# Patient Record
Sex: FEMALE | URBAN - NONMETROPOLITAN AREA
[De-identification: names, ages, dates, MRNs, and addresses within clinical notes are randomized per-mention and may not be internally consistent; named-entity substitution may affect disease eponyms.]

---

## 2023-11-21 ENCOUNTER — TRANSCRIBE ORDERS (OUTPATIENT)
Dept: ADMINISTRATIVE | Facility: HOSPITAL | Age: 68
End: 2023-11-21
Payer: COMMERCIAL

## 2023-11-21 DIAGNOSIS — Z12.31 ENCOUNTER FOR SCREENING MAMMOGRAM FOR MALIGNANT NEOPLASM OF BREAST: Primary | ICD-10-CM

## 2024-01-11 LAB — NCCN CRITERIA FLAG: NORMAL

## 2024-01-25 ENCOUNTER — HOSPITAL ENCOUNTER (OUTPATIENT)
Dept: MAMMOGRAPHY | Facility: HOSPITAL | Age: 69
Discharge: HOME | End: 2024-01-25
Admitting: NURSE PRACTITIONER
Payer: MEDICARE

## 2024-01-25 DIAGNOSIS — Z12.31 ENCOUNTER FOR SCREENING MAMMOGRAM FOR MALIGNANT NEOPLASM OF BREAST: ICD-10-CM

## 2024-01-25 PROCEDURE — 77063 BREAST TOMOSYNTHESIS BI: CPT

## 2024-01-25 PROCEDURE — 77067 SCR MAMMO BI INCL CAD: CPT

## 2024-11-15 ENCOUNTER — HOSPITAL ENCOUNTER (OUTPATIENT)
Dept: PREADMISSION TESTING | Age: 69
Discharge: HOME | End: 2024-11-19
Payer: MEDICARE

## 2024-11-15 VITALS — HEIGHT: 62 IN | BODY MASS INDEX: 19.88 KG/M2 | WEIGHT: 108 LBS

## 2024-11-15 LAB
ABO/RH: NORMAL
ANION GAP SERPL CALCULATED.3IONS-SCNC: 13 MMOL/L (ref 7–19)
ANTIBODY SCREEN: NORMAL
APTT PPP: 27.6 SEC (ref 26–36.2)
BASOPHILS # BLD: 0.1 K/UL (ref 0–0.2)
BASOPHILS NFR BLD: 0.7 % (ref 0–1)
BUN SERPL-MCNC: 14 MG/DL (ref 8–23)
CALCIUM SERPL-MCNC: 10.1 MG/DL (ref 8.8–10.2)
CHLORIDE SERPL-SCNC: 96 MMOL/L (ref 98–111)
CO2 SERPL-SCNC: 27 MMOL/L (ref 22–29)
CREAT SERPL-MCNC: 0.8 MG/DL (ref 0.5–0.9)
EOSINOPHIL # BLD: 0.1 K/UL (ref 0–0.6)
EOSINOPHIL NFR BLD: 0.9 % (ref 0–5)
ERYTHROCYTE [DISTWIDTH] IN BLOOD BY AUTOMATED COUNT: 14.1 % (ref 11.5–14.5)
GLUCOSE SERPL-MCNC: 83 MG/DL (ref 70–99)
HCT VFR BLD AUTO: 45.9 % (ref 37–47)
HGB BLD-MCNC: 14.9 G/DL (ref 12–16)
IMM GRANULOCYTES # BLD: 0 K/UL
INR PPP: 1.01 (ref 0.88–1.18)
LYMPHOCYTES # BLD: 1.7 K/UL (ref 1.1–4.5)
LYMPHOCYTES NFR BLD: 24.6 % (ref 20–40)
MCH RBC QN AUTO: 31.7 PG (ref 27–31)
MCHC RBC AUTO-ENTMCNC: 32.5 G/DL (ref 33–37)
MCV RBC AUTO: 97.7 FL (ref 81–99)
MONOCYTES # BLD: 0.6 K/UL (ref 0–0.9)
MONOCYTES NFR BLD: 8.7 % (ref 0–10)
NEUTROPHILS # BLD: 4.5 K/UL (ref 1.5–7.5)
NEUTS SEG NFR BLD: 64.7 % (ref 50–65)
PLATELET # BLD AUTO: 375 K/UL (ref 130–400)
PMV BLD AUTO: 9.6 FL (ref 9.4–12.3)
POTASSIUM SERPL-SCNC: 4.5 MMOL/L (ref 3.5–5)
PROTHROMBIN TIME: 13 SEC (ref 12–14.6)
RBC # BLD AUTO: 4.7 M/UL (ref 4.2–5.4)
SODIUM SERPL-SCNC: 136 MMOL/L (ref 136–145)
WBC # BLD AUTO: 6.9 K/UL (ref 4.8–10.8)

## 2024-11-15 PROCEDURE — 85610 PROTHROMBIN TIME: CPT

## 2024-11-15 PROCEDURE — 80048 BASIC METABOLIC PNL TOTAL CA: CPT

## 2024-11-15 PROCEDURE — 85730 THROMBOPLASTIN TIME PARTIAL: CPT

## 2024-11-15 PROCEDURE — 93005 ELECTROCARDIOGRAM TRACING: CPT | Performed by: SURGERY

## 2024-11-15 PROCEDURE — 86850 RBC ANTIBODY SCREEN: CPT

## 2024-11-15 PROCEDURE — 86901 BLOOD TYPING SEROLOGIC RH(D): CPT

## 2024-11-15 PROCEDURE — 85025 COMPLETE CBC W/AUTO DIFF WBC: CPT

## 2024-11-15 PROCEDURE — 86900 BLOOD TYPING SEROLOGIC ABO: CPT

## 2024-11-15 RX ORDER — CLOPIDOGREL BISULFATE 75 MG/1
1 TABLET ORAL
COMMUNITY
Start: 2024-11-12 | End: 2025-02-10

## 2024-11-15 RX ORDER — UMECLIDINIUM BROMIDE AND VILANTEROL TRIFENATATE 62.5; 25 UG/1; UG/1
1 POWDER RESPIRATORY (INHALATION) DAILY PRN
COMMUNITY

## 2024-11-15 RX ORDER — ALBUTEROL SULFATE 90 UG/1
2 INHALANT RESPIRATORY (INHALATION) EVERY 6 HOURS PRN
COMMUNITY

## 2024-11-15 RX ORDER — DICLOFENAC SODIUM 75 MG/1
75 TABLET, DELAYED RELEASE ORAL 2 TIMES DAILY
COMMUNITY

## 2024-11-15 RX ORDER — CILOSTAZOL 100 MG/1
100 TABLET ORAL 2 TIMES DAILY
COMMUNITY

## 2024-11-15 RX ORDER — EZETIMIBE 10 MG/1
10 TABLET ORAL NIGHTLY
COMMUNITY

## 2024-11-15 RX ORDER — GABAPENTIN 100 MG/1
100 CAPSULE ORAL 3 TIMES DAILY
COMMUNITY

## 2024-11-15 RX ORDER — TRAZODONE HYDROCHLORIDE 100 MG/1
100 TABLET ORAL NIGHTLY
COMMUNITY

## 2024-11-15 NOTE — DISCHARGE INSTRUCTIONS
PREOPERATIVE GUIDELINES WHEN RECEIVING ANESTHESIA    Do not eat anything after midnight the night before your surgery. You may have water up to 2 hours before your arrival time. No gum or candy the morning of surgery.  This is extremely important for your safety.    Take a bath (or shower) the night before your surgery and you may brush your teeth the morning of your surgery.    You will be scheduled to arrive at the hospital 2 hours before your surgery, or follow your surgeon's instructions.    Dress comfortably.  Wear loose clothing that will be easy to remove and comfortable for your trip home.    You may wear eyeglasses but bring your cases with you as they must be remove before your surgery. If you wear contacts they will have to be removed before your surgery.    Hearing aids and dentures will need to be removed before your surgery. If you wear dentures, do not glue them in the morning of surgery.     Do not wear any jewelry, including body jewelry.  All jewelry will need to be removed prior to your surgery. This includes wedding rings. Any metal touching your body can cause a burn or may have to be cut off due to swelling or injury.    Do not wear fingernail polish or make-up.    It is best not to bring any valuables with you.    If you are to stay in the hospital overnight, bring your robe, slippers and personal toiletries that you may need.    POSTOPERATIVE GUIDELINES AFTER RECEIVING ANESTHESIA    If you are to go home after your surgery, you will need a responsible adult to drive you home.     You will not be able to take public transportation after your discharge from the Operative Care Unit unless you are accompanied by a        responsible adult.    On returning home, be sure to follow your physician's orders regarding diet, activity and medications.    Remember, surgery with general anesthesia or sedation may leave you sleepy, very tired and with a decreased appetite for 12 to 24 hours.    If you

## 2024-11-16 LAB
EKG P AXIS: 77 DEGREES
EKG P-R INTERVAL: 112 MS
EKG Q-T INTERVAL: 324 MS
EKG QRS DURATION: 74 MS
EKG QTC CALCULATION (BAZETT): 399 MS
EKG T AXIS: 11 DEGREES

## 2024-11-16 PROCEDURE — 93010 ELECTROCARDIOGRAM REPORT: CPT | Performed by: INTERNAL MEDICINE

## 2024-12-03 ENCOUNTER — ANESTHESIA EVENT (OUTPATIENT)
Dept: OPERATING ROOM | Age: 69
End: 2024-12-03
Payer: MEDICARE

## 2024-12-03 ENCOUNTER — ANESTHESIA (OUTPATIENT)
Dept: OPERATING ROOM | Age: 69
End: 2024-12-03
Payer: MEDICARE

## 2024-12-03 ENCOUNTER — HOSPITAL ENCOUNTER (OUTPATIENT)
Age: 69
Setting detail: OBSERVATION
LOS: 1 days | Discharge: HOME | End: 2024-12-04
Attending: SURGERY | Admitting: SURGERY
Payer: MEDICARE

## 2024-12-03 ENCOUNTER — APPOINTMENT (OUTPATIENT)
Dept: INTERVENTIONAL RADIOLOGY/VASCULAR | Age: 69
End: 2024-12-03
Attending: SURGERY
Payer: MEDICARE

## 2024-12-03 DIAGNOSIS — G89.18 POSTOPERATIVE PAIN: ICD-10-CM

## 2024-12-03 DIAGNOSIS — I70.213 ATHEROSCLEROSIS OF NATIVE ARTERIES OF EXTREMITIES WITH INTERMITTENT CLAUDICATION, BILATERAL LEGS (HCC): Primary | ICD-10-CM

## 2024-12-03 DIAGNOSIS — R07.9 CHEST PAIN, UNSPECIFIED TYPE: ICD-10-CM

## 2024-12-03 DIAGNOSIS — I48.91 ATRIAL FIBRILLATION, UNSPECIFIED TYPE (HCC): ICD-10-CM

## 2024-12-03 LAB
ABO/RH: NORMAL
ANTIBODY SCREEN: NORMAL

## 2024-12-03 PROCEDURE — 86900 BLOOD TYPING SEROLOGIC ABO: CPT

## 2024-12-03 PROCEDURE — 6360000004 HC RX CONTRAST MEDICATION: Performed by: SURGERY

## 2024-12-03 PROCEDURE — 86850 RBC ANTIBODY SCREEN: CPT

## 2024-12-03 PROCEDURE — A4217 STERILE WATER/SALINE, 500 ML: HCPCS | Performed by: SURGERY

## 2024-12-03 PROCEDURE — C1769 GUIDE WIRE: HCPCS | Performed by: SURGERY

## 2024-12-03 PROCEDURE — 6360000002 HC RX W HCPCS: Performed by: SURGERY

## 2024-12-03 PROCEDURE — 2580000003 HC RX 258: Performed by: NURSE ANESTHETIST, CERTIFIED REGISTERED

## 2024-12-03 PROCEDURE — 94640 AIRWAY INHALATION TREATMENT: CPT

## 2024-12-03 PROCEDURE — 7100000000 HC PACU RECOVERY - FIRST 15 MIN: Performed by: SURGERY

## 2024-12-03 PROCEDURE — 3700000001 HC ADD 15 MINUTES (ANESTHESIA): Performed by: SURGERY

## 2024-12-03 PROCEDURE — C1887 CATHETER, GUIDING: HCPCS | Performed by: SURGERY

## 2024-12-03 PROCEDURE — 2580000003 HC RX 258: Performed by: SURGERY

## 2024-12-03 PROCEDURE — 2580000003 HC RX 258: Performed by: ANESTHESIOLOGY

## 2024-12-03 PROCEDURE — 3600000005 HC SURGERY LEVEL 5 BASE: Performed by: SURGERY

## 2024-12-03 PROCEDURE — C1876 STENT, NON-COA/NON-COV W/DEL: HCPCS | Performed by: SURGERY

## 2024-12-03 PROCEDURE — C1894 INTRO/SHEATH, NON-LASER: HCPCS | Performed by: SURGERY

## 2024-12-03 PROCEDURE — 86901 BLOOD TYPING SEROLOGIC RH(D): CPT

## 2024-12-03 PROCEDURE — C1753 CATH, INTRAVAS ULTRASOUND: HCPCS | Performed by: SURGERY

## 2024-12-03 PROCEDURE — 2709999900 HC NON-CHARGEABLE SUPPLY: Performed by: SURGERY

## 2024-12-03 PROCEDURE — 2500000003 HC RX 250 WO HCPCS: Performed by: NURSE ANESTHETIST, CERTIFIED REGISTERED

## 2024-12-03 PROCEDURE — 6360000002 HC RX W HCPCS: Performed by: NURSE ANESTHETIST, CERTIFIED REGISTERED

## 2024-12-03 PROCEDURE — 7100000001 HC PACU RECOVERY - ADDTL 15 MIN: Performed by: SURGERY

## 2024-12-03 PROCEDURE — C1713 ANCHOR/SCREW BN/BN,TIS/BN: HCPCS | Performed by: SURGERY

## 2024-12-03 PROCEDURE — 6370000000 HC RX 637 (ALT 250 FOR IP): Performed by: SURGERY

## 2024-12-03 PROCEDURE — 3700000000 HC ANESTHESIA ATTENDED CARE: Performed by: SURGERY

## 2024-12-03 PROCEDURE — C1874 STENT, COATED/COV W/DEL SYS: HCPCS | Performed by: SURGERY

## 2024-12-03 PROCEDURE — 36415 COLL VENOUS BLD VENIPUNCTURE: CPT

## 2024-12-03 PROCEDURE — C1893 INTRO/SHEATH, FIXED,NON-PEEL: HCPCS | Performed by: SURGERY

## 2024-12-03 PROCEDURE — C1725 CATH, TRANSLUMIN NON-LASER: HCPCS | Performed by: SURGERY

## 2024-12-03 PROCEDURE — 2700000000 HC OXYGEN THERAPY PER DAY

## 2024-12-03 PROCEDURE — 94150 VITAL CAPACITY TEST: CPT

## 2024-12-03 PROCEDURE — 6360000002 HC RX W HCPCS: Performed by: ANESTHESIOLOGY

## 2024-12-03 PROCEDURE — G0378 HOSPITAL OBSERVATION PER HR: HCPCS

## 2024-12-03 PROCEDURE — 2500000003 HC RX 250 WO HCPCS: Performed by: ANESTHESIOLOGY

## 2024-12-03 PROCEDURE — 94760 N-INVAS EAR/PLS OXIMETRY 1: CPT

## 2024-12-03 PROCEDURE — C1760 CLOSURE DEV, VASC: HCPCS | Performed by: SURGERY

## 2024-12-03 PROCEDURE — 3600000015 HC SURGERY LEVEL 5 ADDTL 15MIN: Performed by: SURGERY

## 2024-12-03 DEVICE — IMPLANTABLE DEVICE: Type: IMPLANTABLE DEVICE | Site: AORTA | Status: FUNCTIONAL

## 2024-12-03 DEVICE — PREMOUNTED STENT SYSTEM
Type: IMPLANTABLE DEVICE | Site: AORTA | Status: FUNCTIONAL
Brand: EXPRESS® LD ILIAC / BILIARY

## 2024-12-03 DEVICE — GRAFT EVAR STNT L59MM DIA11-16MM CATH L135CM INTRO SHTH 8FR: Type: IMPLANTABLE DEVICE | Site: AORTA | Status: FUNCTIONAL

## 2024-12-03 DEVICE — DRUG-ELUTING VASCULAR STENT SYSTEM
Type: IMPLANTABLE DEVICE | Site: AORTA | Status: FUNCTIONAL
Brand: ELUVIA™

## 2024-12-03 RX ORDER — SODIUM CHLORIDE 0.9 % (FLUSH) 0.9 %
5-40 SYRINGE (ML) INJECTION EVERY 12 HOURS SCHEDULED
Status: DISCONTINUED | OUTPATIENT
Start: 2024-12-03 | End: 2024-12-04 | Stop reason: HOSPADM

## 2024-12-03 RX ORDER — SODIUM CHLORIDE, SODIUM LACTATE, POTASSIUM CHLORIDE, CALCIUM CHLORIDE 600; 310; 30; 20 MG/100ML; MG/100ML; MG/100ML; MG/100ML
INJECTION, SOLUTION INTRAVENOUS CONTINUOUS
Status: DISCONTINUED | OUTPATIENT
Start: 2024-12-03 | End: 2024-12-03 | Stop reason: HOSPADM

## 2024-12-03 RX ORDER — PROPOFOL 10 MG/ML
INJECTION, EMULSION INTRAVENOUS
Status: DISCONTINUED | OUTPATIENT
Start: 2024-12-03 | End: 2024-12-03 | Stop reason: SDUPTHER

## 2024-12-03 RX ORDER — SODIUM CHLORIDE 0.9 % (FLUSH) 0.9 %
5-40 SYRINGE (ML) INJECTION EVERY 12 HOURS SCHEDULED
Status: DISCONTINUED | OUTPATIENT
Start: 2024-12-03 | End: 2024-12-03 | Stop reason: HOSPADM

## 2024-12-03 RX ORDER — ONDANSETRON 2 MG/ML
4 INJECTION INTRAMUSCULAR; INTRAVENOUS
Status: DISCONTINUED | OUTPATIENT
Start: 2024-12-03 | End: 2024-12-04 | Stop reason: HOSPADM

## 2024-12-03 RX ORDER — SODIUM CHLORIDE 9 MG/ML
INJECTION, SOLUTION INTRAVENOUS PRN
Status: DISCONTINUED | OUTPATIENT
Start: 2024-12-03 | End: 2024-12-04 | Stop reason: HOSPADM

## 2024-12-03 RX ORDER — HYDROMORPHONE HYDROCHLORIDE 1 MG/ML
0.5 INJECTION, SOLUTION INTRAMUSCULAR; INTRAVENOUS; SUBCUTANEOUS EVERY 5 MIN PRN
Status: DISCONTINUED | OUTPATIENT
Start: 2024-12-03 | End: 2024-12-03 | Stop reason: HOSPADM

## 2024-12-03 RX ORDER — SODIUM CHLORIDE 0.9 % (FLUSH) 0.9 %
5-40 SYRINGE (ML) INJECTION PRN
Status: DISCONTINUED | OUTPATIENT
Start: 2024-12-03 | End: 2024-12-03 | Stop reason: HOSPADM

## 2024-12-03 RX ORDER — GABAPENTIN 100 MG/1
100 CAPSULE ORAL 3 TIMES DAILY
Status: DISCONTINUED | OUTPATIENT
Start: 2024-12-03 | End: 2024-12-04 | Stop reason: HOSPADM

## 2024-12-03 RX ORDER — FENTANYL CITRATE 50 UG/ML
INJECTION, SOLUTION INTRAMUSCULAR; INTRAVENOUS
Status: DISCONTINUED | OUTPATIENT
Start: 2024-12-03 | End: 2024-12-03 | Stop reason: SDUPTHER

## 2024-12-03 RX ORDER — ONDANSETRON 2 MG/ML
4 INJECTION INTRAMUSCULAR; INTRAVENOUS
Status: DISCONTINUED | OUTPATIENT
Start: 2024-12-03 | End: 2024-12-03 | Stop reason: HOSPADM

## 2024-12-03 RX ORDER — NITROGLYCERIN 20 MG/100ML
INJECTION INTRAVENOUS
Status: DISCONTINUED | OUTPATIENT
Start: 2024-12-03 | End: 2024-12-03 | Stop reason: SDUPTHER

## 2024-12-03 RX ORDER — NALOXONE HYDROCHLORIDE 0.4 MG/ML
INJECTION, SOLUTION INTRAMUSCULAR; INTRAVENOUS; SUBCUTANEOUS PRN
Status: DISCONTINUED | OUTPATIENT
Start: 2024-12-03 | End: 2024-12-03 | Stop reason: HOSPADM

## 2024-12-03 RX ORDER — OXYCODONE HYDROCHLORIDE 10 MG/1
10 TABLET ORAL EVERY 4 HOURS PRN
Status: DISCONTINUED | OUTPATIENT
Start: 2024-12-03 | End: 2024-12-04 | Stop reason: HOSPADM

## 2024-12-03 RX ORDER — ROCURONIUM BROMIDE 10 MG/ML
INJECTION, SOLUTION INTRAVENOUS
Status: DISCONTINUED | OUTPATIENT
Start: 2024-12-03 | End: 2024-12-03 | Stop reason: SDUPTHER

## 2024-12-03 RX ORDER — HYDROMORPHONE HYDROCHLORIDE 1 MG/ML
0.5 INJECTION, SOLUTION INTRAMUSCULAR; INTRAVENOUS; SUBCUTANEOUS
Status: DISCONTINUED | OUTPATIENT
Start: 2024-12-03 | End: 2024-12-04 | Stop reason: HOSPADM

## 2024-12-03 RX ORDER — DEXAMETHASONE SODIUM PHOSPHATE 4 MG/ML
4 INJECTION, SOLUTION INTRA-ARTICULAR; INTRALESIONAL; INTRAMUSCULAR; INTRAVENOUS; SOFT TISSUE ONCE
Status: COMPLETED | OUTPATIENT
Start: 2024-12-03 | End: 2024-12-03

## 2024-12-03 RX ORDER — SODIUM CHLORIDE 0.9 % (FLUSH) 0.9 %
5-40 SYRINGE (ML) INJECTION PRN
Status: DISCONTINUED | OUTPATIENT
Start: 2024-12-03 | End: 2024-12-04 | Stop reason: HOSPADM

## 2024-12-03 RX ORDER — HYDROMORPHONE HYDROCHLORIDE 1 MG/ML
0.5 INJECTION, SOLUTION INTRAMUSCULAR; INTRAVENOUS; SUBCUTANEOUS EVERY 5 MIN PRN
Status: DISCONTINUED | OUTPATIENT
Start: 2024-12-03 | End: 2024-12-04 | Stop reason: HOSPADM

## 2024-12-03 RX ORDER — HYDROMORPHONE HYDROCHLORIDE 1 MG/ML
0.25 INJECTION, SOLUTION INTRAMUSCULAR; INTRAVENOUS; SUBCUTANEOUS
Status: DISCONTINUED | OUTPATIENT
Start: 2024-12-03 | End: 2024-12-04 | Stop reason: HOSPADM

## 2024-12-03 RX ORDER — METOPROLOL TARTRATE 25 MG/1
25 TABLET, FILM COATED ORAL EVERY 12 HOURS PRN
Status: DISCONTINUED | OUTPATIENT
Start: 2024-12-03 | End: 2024-12-04 | Stop reason: HOSPADM

## 2024-12-03 RX ORDER — ALBUTEROL SULFATE 90 UG/1
2 INHALANT RESPIRATORY (INHALATION) EVERY 6 HOURS PRN
Status: DISCONTINUED | OUTPATIENT
Start: 2024-12-03 | End: 2024-12-04 | Stop reason: HOSPADM

## 2024-12-03 RX ORDER — CILOSTAZOL 50 MG/1
100 TABLET ORAL 2 TIMES DAILY
Status: DISCONTINUED | OUTPATIENT
Start: 2024-12-03 | End: 2024-12-04 | Stop reason: HOSPADM

## 2024-12-03 RX ORDER — OXYCODONE HYDROCHLORIDE 5 MG/1
5 TABLET ORAL EVERY 4 HOURS PRN
Status: DISCONTINUED | OUTPATIENT
Start: 2024-12-03 | End: 2024-12-04 | Stop reason: HOSPADM

## 2024-12-03 RX ORDER — SODIUM CHLORIDE 9 MG/ML
INJECTION, SOLUTION INTRAVENOUS CONTINUOUS
Status: ACTIVE | OUTPATIENT
Start: 2024-12-03 | End: 2024-12-03

## 2024-12-03 RX ORDER — HYDRALAZINE HYDROCHLORIDE 20 MG/ML
10 INJECTION INTRAMUSCULAR; INTRAVENOUS
Status: DISCONTINUED | OUTPATIENT
Start: 2024-12-03 | End: 2024-12-04 | Stop reason: HOSPADM

## 2024-12-03 RX ORDER — IODIXANOL 320 MG/ML
INJECTION, SOLUTION INTRAVASCULAR PRN
Status: DISCONTINUED | OUTPATIENT
Start: 2024-12-03 | End: 2024-12-03 | Stop reason: ALTCHOICE

## 2024-12-03 RX ORDER — NALOXONE HYDROCHLORIDE 0.4 MG/ML
INJECTION, SOLUTION INTRAMUSCULAR; INTRAVENOUS; SUBCUTANEOUS PRN
Status: DISCONTINUED | OUTPATIENT
Start: 2024-12-03 | End: 2024-12-04 | Stop reason: HOSPADM

## 2024-12-03 RX ORDER — ONDANSETRON 2 MG/ML
INJECTION INTRAMUSCULAR; INTRAVENOUS
Status: DISCONTINUED | OUTPATIENT
Start: 2024-12-03 | End: 2024-12-03 | Stop reason: SDUPTHER

## 2024-12-03 RX ORDER — ESMOLOL HYDROCHLORIDE 10 MG/ML
INJECTION INTRAVENOUS
Status: DISCONTINUED | OUTPATIENT
Start: 2024-12-03 | End: 2024-12-03 | Stop reason: SDUPTHER

## 2024-12-03 RX ORDER — TRAZODONE HYDROCHLORIDE 100 MG/1
100 TABLET ORAL NIGHTLY
Status: DISCONTINUED | OUTPATIENT
Start: 2024-12-03 | End: 2024-12-04 | Stop reason: HOSPADM

## 2024-12-03 RX ORDER — ONDANSETRON 2 MG/ML
4 INJECTION INTRAMUSCULAR; INTRAVENOUS EVERY 6 HOURS PRN
Status: DISCONTINUED | OUTPATIENT
Start: 2024-12-03 | End: 2024-12-04 | Stop reason: HOSPADM

## 2024-12-03 RX ORDER — HEPARIN SODIUM 1000 [USP'U]/ML
INJECTION, SOLUTION INTRAVENOUS; SUBCUTANEOUS
Status: DISCONTINUED | OUTPATIENT
Start: 2024-12-03 | End: 2024-12-03 | Stop reason: SDUPTHER

## 2024-12-03 RX ORDER — ONDANSETRON 4 MG/1
4 TABLET, ORALLY DISINTEGRATING ORAL EVERY 8 HOURS PRN
Status: DISCONTINUED | OUTPATIENT
Start: 2024-12-03 | End: 2024-12-04 | Stop reason: HOSPADM

## 2024-12-03 RX ORDER — SODIUM CHLORIDE 9 MG/ML
INJECTION, SOLUTION INTRAVENOUS PRN
Status: DISCONTINUED | OUTPATIENT
Start: 2024-12-03 | End: 2024-12-03 | Stop reason: HOSPADM

## 2024-12-03 RX ORDER — HYDROMORPHONE HYDROCHLORIDE 1 MG/ML
0.25 INJECTION, SOLUTION INTRAMUSCULAR; INTRAVENOUS; SUBCUTANEOUS EVERY 5 MIN PRN
Status: DISCONTINUED | OUTPATIENT
Start: 2024-12-03 | End: 2024-12-03 | Stop reason: HOSPADM

## 2024-12-03 RX ORDER — EZETIMIBE 10 MG/1
10 TABLET ORAL NIGHTLY
Status: DISCONTINUED | OUTPATIENT
Start: 2024-12-03 | End: 2024-12-04 | Stop reason: HOSPADM

## 2024-12-03 RX ORDER — CLOPIDOGREL BISULFATE 75 MG/1
75 TABLET ORAL DAILY
Status: DISCONTINUED | OUTPATIENT
Start: 2024-12-03 | End: 2024-12-04 | Stop reason: HOSPADM

## 2024-12-03 RX ORDER — ENOXAPARIN SODIUM 100 MG/ML
30 INJECTION SUBCUTANEOUS EVERY 24 HOURS
Status: DISCONTINUED | OUTPATIENT
Start: 2024-12-04 | End: 2024-12-04 | Stop reason: HOSPADM

## 2024-12-03 RX ORDER — HYDROMORPHONE HYDROCHLORIDE 1 MG/ML
0.25 INJECTION, SOLUTION INTRAMUSCULAR; INTRAVENOUS; SUBCUTANEOUS EVERY 5 MIN PRN
Status: DISCONTINUED | OUTPATIENT
Start: 2024-12-03 | End: 2024-12-04 | Stop reason: HOSPADM

## 2024-12-03 RX ADMIN — FENTANYL CITRATE 50 MCG: 0.05 INJECTION, SOLUTION INTRAMUSCULAR; INTRAVENOUS at 14:13

## 2024-12-03 RX ADMIN — CLOPIDOGREL BISULFATE 75 MG: 75 TABLET ORAL at 21:34

## 2024-12-03 RX ADMIN — ESMOLOL HYDROCHLORIDE 10 MG: 10 INJECTION, SOLUTION INTRAVENOUS at 15:39

## 2024-12-03 RX ADMIN — NITROGLYCERIN 100 MCG: 20 INJECTION INTRAVENOUS at 15:48

## 2024-12-03 RX ADMIN — GABAPENTIN 100 MG: 100 CAPSULE ORAL at 21:31

## 2024-12-03 RX ADMIN — HEPARIN SODIUM 5000 UNITS: 1000 INJECTION, SOLUTION INTRAVENOUS; SUBCUTANEOUS at 14:15

## 2024-12-03 RX ADMIN — NITROGLYCERIN 200 MCG: 20 INJECTION INTRAVENOUS at 13:40

## 2024-12-03 RX ADMIN — FENTANYL CITRATE 50 MCG: 0.05 INJECTION, SOLUTION INTRAMUSCULAR; INTRAVENOUS at 13:29

## 2024-12-03 RX ADMIN — ROCURONIUM BROMIDE 60 MG: 10 INJECTION, SOLUTION INTRAVENOUS at 13:35

## 2024-12-03 RX ADMIN — Medication 2 PUFF: at 19:33

## 2024-12-03 RX ADMIN — ONDANSETRON 4 MG: 2 INJECTION INTRAMUSCULAR; INTRAVENOUS at 22:47

## 2024-12-03 RX ADMIN — PHENYLEPHRINE HYDROCHLORIDE 50 MCG: 10 INJECTION INTRAVENOUS at 14:21

## 2024-12-03 RX ADMIN — PROPOFOL 90 MG: 10 INJECTION, EMULSION INTRAVENOUS at 13:35

## 2024-12-03 RX ADMIN — EZETIMIBE 10 MG: 10 TABLET ORAL at 21:31

## 2024-12-03 RX ADMIN — ROCURONIUM BROMIDE 20 MG: 10 INJECTION, SOLUTION INTRAVENOUS at 15:37

## 2024-12-03 RX ADMIN — SODIUM CHLORIDE, SODIUM LACTATE, POTASSIUM CHLORIDE, AND CALCIUM CHLORIDE: 600; 310; 30; 20 INJECTION, SOLUTION INTRAVENOUS at 11:32

## 2024-12-03 RX ADMIN — PHENYLEPHRINE HYDROCHLORIDE 80 MCG/MIN: 10 INJECTION INTRAVENOUS at 13:53

## 2024-12-03 RX ADMIN — HYDROMORPHONE HYDROCHLORIDE 1 MG: 1 INJECTION, SOLUTION INTRAMUSCULAR; INTRAVENOUS; SUBCUTANEOUS at 17:43

## 2024-12-03 RX ADMIN — SUGAMMADEX 200 MG: 100 INJECTION, SOLUTION INTRAVENOUS at 17:28

## 2024-12-03 RX ADMIN — CEFAZOLIN 1000 MG: 2 INJECTION, POWDER, FOR SOLUTION INTRAMUSCULAR; INTRAVENOUS at 13:44

## 2024-12-03 RX ADMIN — ROCURONIUM BROMIDE 20 MG: 10 INJECTION, SOLUTION INTRAVENOUS at 15:17

## 2024-12-03 RX ADMIN — FAMOTIDINE 20 MG: 10 INJECTION, SOLUTION INTRAVENOUS at 11:40

## 2024-12-03 RX ADMIN — HYDROMORPHONE HYDROCHLORIDE 1 MG: 1 INJECTION, SOLUTION INTRAMUSCULAR; INTRAVENOUS; SUBCUTANEOUS at 16:29

## 2024-12-03 RX ADMIN — PHENYLEPHRINE HYDROCHLORIDE 50 MCG: 10 INJECTION INTRAVENOUS at 14:09

## 2024-12-03 RX ADMIN — PROPOFOL 30 MG: 10 INJECTION, EMULSION INTRAVENOUS at 13:39

## 2024-12-03 RX ADMIN — NITROGLYCERIN 100 MCG: 20 INJECTION INTRAVENOUS at 15:37

## 2024-12-03 RX ADMIN — ONDANSETRON 4 MG: 2 INJECTION INTRAMUSCULAR; INTRAVENOUS at 17:10

## 2024-12-03 RX ADMIN — NITROGLYCERIN 100 MCG: 20 INJECTION INTRAVENOUS at 15:18

## 2024-12-03 RX ADMIN — WATER 1000 MG: 1 INJECTION INTRAMUSCULAR; INTRAVENOUS; SUBCUTANEOUS at 22:42

## 2024-12-03 RX ADMIN — HEPARIN SODIUM 1000 UNITS: 1000 INJECTION, SOLUTION INTRAVENOUS; SUBCUTANEOUS at 16:15

## 2024-12-03 RX ADMIN — CILOSTAZOL 100 MG: 50 TABLET ORAL at 21:31

## 2024-12-03 RX ADMIN — PHENYLEPHRINE HYDROCHLORIDE 100 MCG: 10 INJECTION INTRAVENOUS at 13:42

## 2024-12-03 RX ADMIN — NITROGLYCERIN 50 MCG: 20 INJECTION INTRAVENOUS at 15:51

## 2024-12-03 RX ADMIN — NITROGLYCERIN 100 MCG: 20 INJECTION INTRAVENOUS at 15:15

## 2024-12-03 RX ADMIN — DEXAMETHASONE SODIUM PHOSPHATE 4 MG: 4 INJECTION INTRA-ARTICULAR; INTRALESIONAL; INTRAMUSCULAR; INTRAVENOUS; SOFT TISSUE at 11:40

## 2024-12-03 RX ADMIN — ESMOLOL HYDROCHLORIDE 10 MG: 10 INJECTION, SOLUTION INTRAVENOUS at 15:32

## 2024-12-03 RX ADMIN — PHENYLEPHRINE HYDROCHLORIDE 100 MCG: 10 INJECTION INTRAVENOUS at 13:46

## 2024-12-03 RX ADMIN — NITROGLYCERIN 100 MCG: 20 INJECTION INTRAVENOUS at 15:30

## 2024-12-03 RX ADMIN — HEPARIN SODIUM 1000 UNITS: 1000 INJECTION, SOLUTION INTRAVENOUS; SUBCUTANEOUS at 15:15

## 2024-12-03 ASSESSMENT — LIFESTYLE VARIABLES: SMOKING_STATUS: 1

## 2024-12-03 NOTE — PROGRESS NOTES
Pt is flailing around not listening to staff to remain flat and still.  Pt has pulled on her gonzalez and now is having a little blood in the line.

## 2024-12-03 NOTE — INTERVAL H&P NOTE
I agree with the history and physical exam in chart.  In addition, today's complete ROS was performed and the only positives are noted in the HPI.  I examined the patient preoperatively and discussed the surgery, including the risks, benefits, and alternatives.  They seem to understand and agree to proceed.

## 2024-12-03 NOTE — ANESTHESIA PRE PROCEDURE
Department of Anesthesiology  Preprocedure Note       Name:  Flaca Pandya   Age:  69 y.o.  :  1955                                          MRN:  [id number]         Date:  12/3/2024      Surgeon: Surgeon(s):  Torsten Mercedes MD    Procedure: Procedure(s):  AORTAGRAM WITH POSSIBLE AORTA AND BILATERAL STENTS    Medications prior to admission:   Prior to Admission medications    Medication Sig Start Date End Date Taking? Authorizing Provider   ezetimibe (ZETIA) 10 MG tablet Take 1 tablet by mouth nightly Indications: High Amount of Fats in the Blood   Yes Darlene Wasserman MD   gabapentin (NEURONTIN) 100 MG capsule Take 1 capsule by mouth 3 times daily.   Yes Darlene Wasserman MD   diclofenac (VOLTAREN) 75 MG EC tablet Take 1 tablet by mouth 2 times daily Indications: Arthritis   Yes Darlene Wasserman MD   traZODone (DESYREL) 100 MG tablet Take 1 tablet by mouth nightly   Yes Darlene Wasserman MD   cilostazol (PLETAL) 100 MG tablet Take 1 tablet by mouth 2 times daily   Yes Darlene Wasserman MD   clopidogrel (PLAVIX) 75 MG tablet Take 1 tablet by mouth Every Day 11/12/24 2/10/25  Darlene Wasserman MD   umeclidinium-vilanterol (ANORO ELLIPTA) 62.5-25 MCG/ACT inhaler Inhale 1 puff into the lungs daily as needed    Darlene Wasserman MD   albuterol sulfate HFA (VENTOLIN HFA) 108 (90 Base) MCG/ACT inhaler Inhale 2 puffs into the lungs every 6 hours as needed for Wheezing    ProviderDarlene MD       Current medications:    Current Facility-Administered Medications   Medication Dose Route Frequency Provider Last Rate Last Admin    lactated ringers infusion   IntraVENous Continuous Divine Silva MD        ceFAZolin (ANCEF) 2,000 mg in sterile water 20 mL IV syringe  2,000 mg IntraVENous Once Torsten Mercedes MD           Allergies:  No Known Allergies    Problem List:  There is no problem list on file for this patient.      Past Medical History:        Diagnosis Date    Cancer

## 2024-12-03 NOTE — PROGRESS NOTES
MYNX WAS DEPLOYED TO THE LEFT GROIN AT 1642 WITHOUT DIFFICULTY. THE PROGLIDE WAS DEPLOYED TO THE RIGHT GROIN  AT 1634.  PRESSURE DRESSING WAS APPLIED TO BOTH SITES AND ARE CLEAN DRY AND INTACT.

## 2024-12-03 NOTE — ANESTHESIA POSTPROCEDURE EVALUATION
Department of Anesthesiology  Postprocedure Note    Patient: Flaca Pandya  MRN: [id number]  YOB: 1955  Date of evaluation: 12/3/2024    Procedure Summary       Date: 12/03/24 Room / Location: [facility] OR [address]    Anesthesia Start: 1329 Anesthesia Stop: 1745    Procedure: AORTAGRAM WITH POSSIBLE AORTA AND BILATERAL STENTS (Groin) Diagnosis:       Atherosclerosis of native artery of both lower extremities with intermittent claudication (HCC)      (Atherosclerosis of native artery of both lower extremities with intermittent claudication (HCC) [I70.213])    Surgeons: Torsten Mercedes MD Responsible Provider: Isadora Newby APRN - CRNA    Anesthesia Type: general ASA Status: 3            Anesthesia Type: No value filed.    Fara Phase I: Fara Score: 10    Fara Phase II:      Anesthesia Post Evaluation    Patient location during evaluation: PACU  Patient participation: complete - patient participated  Level of consciousness: responsive to verbal stimuli  Pain score: 0  Airway patency: patent  Nausea & Vomiting: no nausea and no vomiting  Cardiovascular status: hemodynamically stable  Respiratory status: acceptable  Hydration status: stable  Pain management: adequate    No notable events documented.

## 2024-12-03 NOTE — OP NOTE
Preoperative diagnosis:  1.  Atherosclerosis native arteries bilateral lower extremities with severe lifestyle limiting claudication    Postoperative diagnosis: Same    Operative procedure:  1.  Ultrasound-guided cannulation bilateral common femoral arteries with eventual 8 Bengali sheath placement on the right and 7 Bengali sheath placement on the left  2.  Ultrasound-guided cannulation left brachial artery with 5/6 Bengali 105 cm sheath  3.  Suprarenal abdominal aortogram with bilateral iliofemoral arteriogram  4.  Infrarenal abdominal aortic stents (express 10 mm x 37 mm uncovered balloon expandable stent postdilated to 11 mm, [place] VBX balloon expandable covered stents (11 mm x 59 mm, 11 mm x 39 mm))  5.  Bilateral common iliac artery kissing stents (7 mm x 59 mm [place] VBX balloon expandable covered stents)  6.  Right common/external iliac artery stents (express 7 mm x 57 mm, 7 mm x 37 mm balloon expandable uncovered stents, Jenna 7 mm x 60 mm uncovered self-expanding drug-coated stent)  7.  Left common/external iliac artery stents (express 7 mm x 57 mm, 7 mm x 37 mm balloon expandable uncovered stents, Jenna 7 mm x 60 mm uncovered self-expanding drug-coated stent)  8.  Intravascular ultrasound of right iliac artery and abdominal aorta  9.  Pro-glide closure right common femoral artery  10.  Mynx closure left common femoral artery  11.  Completion abdominal aortogram with bilateral iliofemoral arteriogram  12.  Open repair of left brachial artery sheath site    Surgeon: Torsten Mercedes MD    Anesthesia: General    Estimated blood loss: 50 mL    Findings:  1.  There is an infrarenal abdominal aortic occlusion which is chronic with fairly hard plaque beginning just inferior to an accessory right renal artery.  There are 2 right renal arteries, the superior is the largest.  There is 1 left renal artery.  Bilateral common iliac arteries are occluded.  There are severe 90% stenoses in the bilateral external iliac

## 2024-12-03 NOTE — PROGRESS NOTES
POSTPROCEDURE PULSES ARE PRESENT ON THE LEFT X2 DP +1 AND PT +1. POSTPROCEDURE PULSES ARE PRESENT ON THE RIGHT X2 DP+1 AND PT +1.

## 2024-12-04 ENCOUNTER — APPOINTMENT (OUTPATIENT)
Age: 69
End: 2024-12-04
Attending: INTERNAL MEDICINE
Payer: MEDICARE

## 2024-12-04 ENCOUNTER — HOSPITAL ENCOUNTER (OUTPATIENT)
Age: 69
Setting detail: OBSERVATION
Discharge: HOME | End: 2024-12-06
Attending: SURGERY
Payer: MEDICARE

## 2024-12-04 VITALS
OXYGEN SATURATION: 96 % | HEART RATE: 98 BPM | RESPIRATION RATE: 16 BRPM | SYSTOLIC BLOOD PRESSURE: 120 MMHG | DIASTOLIC BLOOD PRESSURE: 59 MMHG | WEIGHT: 108 LBS | BODY MASS INDEX: 19.88 KG/M2 | HEIGHT: 62 IN | TEMPERATURE: 97.8 F

## 2024-12-04 LAB
ANION GAP SERPL CALCULATED.3IONS-SCNC: 9 MMOL/L (ref 7–19)
BNP BLD-MCNC: 784 PG/ML (ref 0–124)
BUN SERPL-MCNC: 15 MG/DL (ref 8–23)
CALCIUM SERPL-MCNC: 8.4 MG/DL (ref 8.8–10.2)
CHLORIDE SERPL-SCNC: 103 MMOL/L (ref 98–111)
CO2 SERPL-SCNC: 25 MMOL/L (ref 22–29)
CREAT SERPL-MCNC: 0.8 MG/DL (ref 0.5–0.9)
ECHO BSA: 1.46 M2
ERYTHROCYTE [DISTWIDTH] IN BLOOD BY AUTOMATED COUNT: 13.9 % (ref 11.5–14.5)
GLUCOSE SERPL-MCNC: 94 MG/DL (ref 70–99)
HCT VFR BLD AUTO: 36.6 % (ref 37–47)
HGB BLD-MCNC: 12 G/DL (ref 12–16)
MCH RBC QN AUTO: 32.4 PG (ref 27–31)
MCHC RBC AUTO-ENTMCNC: 32.8 G/DL (ref 33–37)
MCV RBC AUTO: 98.9 FL (ref 81–99)
PLATELET # BLD AUTO: 288 K/UL (ref 130–400)
PMV BLD AUTO: 10.1 FL (ref 9.4–12.3)
POTASSIUM SERPL-SCNC: 4.3 MMOL/L (ref 3.5–5)
RBC # BLD AUTO: 3.7 M/UL (ref 4.2–5.4)
SODIUM SERPL-SCNC: 137 MMOL/L (ref 136–145)
TROPONIN, HIGH SENSITIVITY: 27 NG/L (ref 0–14)
TSH SERPL DL<=0.005 MIU/L-ACNC: 0.72 UIU/ML (ref 0.27–4.2)
WBC # BLD AUTO: 13.5 K/UL (ref 4.8–10.8)

## 2024-12-04 PROCEDURE — 6370000000 HC RX 637 (ALT 250 FOR IP): Performed by: SURGERY

## 2024-12-04 PROCEDURE — 96372 THER/PROPH/DIAG INJ SC/IM: CPT

## 2024-12-04 PROCEDURE — G0378 HOSPITAL OBSERVATION PER HR: HCPCS

## 2024-12-04 PROCEDURE — 6360000002 HC RX W HCPCS: Performed by: SURGERY

## 2024-12-04 PROCEDURE — 83880 ASSAY OF NATRIURETIC PEPTIDE: CPT

## 2024-12-04 PROCEDURE — 85027 COMPLETE CBC AUTOMATED: CPT

## 2024-12-04 PROCEDURE — 6360000004 HC RX CONTRAST MEDICATION: Performed by: INTERNAL MEDICINE

## 2024-12-04 PROCEDURE — 93246 EXT ECG>7D<15D RECORDING: CPT

## 2024-12-04 PROCEDURE — 94760 N-INVAS EAR/PLS OXIMETRY 1: CPT

## 2024-12-04 PROCEDURE — 80048 BASIC METABOLIC PNL TOTAL CA: CPT

## 2024-12-04 PROCEDURE — 94640 AIRWAY INHALATION TREATMENT: CPT

## 2024-12-04 PROCEDURE — 84484 ASSAY OF TROPONIN QUANT: CPT

## 2024-12-04 PROCEDURE — 2580000003 HC RX 258: Performed by: SURGERY

## 2024-12-04 PROCEDURE — 2580000003 HC RX 258: Performed by: INTERNAL MEDICINE

## 2024-12-04 PROCEDURE — 84443 ASSAY THYROID STIM HORMONE: CPT

## 2024-12-04 PROCEDURE — C8929 TTE W OR WO FOL WCON,DOPPLER: HCPCS

## 2024-12-04 PROCEDURE — 36415 COLL VENOUS BLD VENIPUNCTURE: CPT

## 2024-12-04 RX ORDER — OXYCODONE AND ACETAMINOPHEN 5; 325 MG/1; MG/1
1 TABLET ORAL EVERY 8 HOURS PRN
Qty: 20 TABLET | Refills: 0 | Status: SHIPPED | OUTPATIENT
Start: 2024-12-04 | End: 2024-12-11

## 2024-12-04 RX ORDER — CILOSTAZOL 100 MG/1
100 TABLET ORAL 2 TIMES DAILY
Qty: 60 TABLET | Refills: 3 | Status: SHIPPED | OUTPATIENT
Start: 2024-12-04

## 2024-12-04 RX ADMIN — CILOSTAZOL 100 MG: 50 TABLET ORAL at 08:43

## 2024-12-04 RX ADMIN — ENOXAPARIN SODIUM 30 MG: 100 INJECTION SUBCUTANEOUS at 02:58

## 2024-12-04 RX ADMIN — WATER 1000 MG: 1 INJECTION INTRAMUSCULAR; INTRAVENOUS; SUBCUTANEOUS at 05:01

## 2024-12-04 RX ADMIN — Medication 2 PUFF: at 06:58

## 2024-12-04 RX ADMIN — SODIUM CHLORIDE, PRESERVATIVE FREE 1.5 ML: 5 INJECTION INTRAVENOUS at 13:54

## 2024-12-04 RX ADMIN — CLOPIDOGREL BISULFATE 75 MG: 75 TABLET ORAL at 08:43

## 2024-12-04 RX ADMIN — GABAPENTIN 100 MG: 100 CAPSULE ORAL at 14:38

## 2024-12-04 RX ADMIN — GABAPENTIN 100 MG: 100 CAPSULE ORAL at 08:43

## 2024-12-04 ASSESSMENT — ENCOUNTER SYMPTOMS
SHORTNESS OF BREATH: 0
DIARRHEA: 0
GASTROINTESTINAL NEGATIVE: 1
RESPIRATORY NEGATIVE: 1
NAUSEA: 0
VOMITING: 0
EYES NEGATIVE: 1

## 2024-12-04 NOTE — PLAN OF CARE
Problem: Discharge Planning  Goal: Discharge to home or other facility with appropriate resources  12/4/2024 0725 by Fracisco Cordero, RN  Outcome: Progressing  12/3/2024 1937 by Maricel Ludwig, RN  Outcome: Progressing  Flowsheets (Taken 12/3/2024 1936)  Discharge to home or other facility with appropriate resources:   Identify barriers to discharge with patient and caregiver   Identify discharge learning needs (meds, wound care, etc)   Refer to discharge planning if patient needs post-hospital services based on physician order or complex needs related to functional status, cognitive ability or social support system   Arrange for needed discharge resources and transportation as appropriate   Arrange for interpreters to assist at discharge as needed     Problem: ABCDS Injury Assessment  Goal: Absence of physical injury  Outcome: Progressing     Problem: Safety - Adult  Goal: Free from fall injury  Outcome: Progressing

## 2024-12-04 NOTE — CONSULTS
[facility]  Cardiology Consult      Requesting MD:  Torsten Mercedes MD   Admit Status:         History obtained from:   [] Patient  [] Other (specify):     Patient:  Flaca Pandya  [id number]     Chief Complaint: No chief complaint on file.        HPI: Ms. Pandya is a 69 y.o. female admitted 12/3/2024 for elective scheduled percutaneous intervention of lower extremities vasculature.  This was subsequently performed apparently successfully.  Had some atrial fibrillation now in sinus rhythm.  Cardiology consulted she was unaware of this asymptomatic.  No prior cardiac history.  Denies any unusual dyspnea denies chest pain.  Had stress test several years ago not recently.  Cardiology consulted.  proBNP 784.  Troponin 27.    Review of Systems:  Review of Systems   Constitutional: Negative.  Negative for chills, fever and unexpected weight change.   HENT: Negative.     Eyes: Negative.    Respiratory: Negative.  Negative for shortness of breath.    Cardiovascular: Negative.  Negative for chest pain.   Gastrointestinal: Negative.  Negative for diarrhea, nausea and vomiting.   Endocrine: Negative.    Genitourinary: Negative.    Musculoskeletal: Negative.    Skin: Negative.    Neurological: Negative.    All other systems reviewed and are negative.      Cardiac Specific Data:  Specialty Problems          Cardiology Problems    * (Principal) Atherosclerosis of native arteries of extremities with intermittent claudication, bilateral legs (HCC)           Past Medical History:  Past Medical History:   Diagnosis Date    Cancer (HCC) 2021    Left breast/lumpectomy    COPD (chronic obstructive pulmonary disease) (formerly [facility])     \"mild\"    Hyperlipidemia     Neuropathy     \"related to vascular issues\"    Osteoporosis     PVD (peripheral vascular disease) (formerly [facility])         Past Surgical History:  Past Surgical History:   Procedure Laterality Date    BREAST SURGERY Left 2021    lumpectomy    TIBIA FRACTURE SURGERY

## 2024-12-04 NOTE — CARE COORDINATION
12/04/24 0847   IMM Letter   Observation Status Letter date given: 12/04/24   Observation Status Letter time given: 0845   Observation Status Letter given to Patient/Family/Significant other/Guardian/POA/by: letter explained to and signed by carmela MARTINS

## 2024-12-04 NOTE — PROGRESS NOTES
Flaca Pandya arrived to room # 324.   Presented with: aortagram  Mental Status: Patient is oriented, alert, and coherent.   Vitals:    12/03/24 2214   BP: 128/62   Pulse: (!) 115   Resp: 18   Temp: 97.6 °F (36.4 °C)   SpO2: 98%     Patient safety contract and falls prevention contract reviewed with patient Yes.  Oriented Patient to room.  Call light within reach. Yes.  Needs, issues or concerns expressed at this time: no.      Electronically signed by Maricel Ludwig RN on 12/3/2024 at 11:39 PM

## 2024-12-04 NOTE — PROGRESS NOTES
Patient declined lexiscan stating she is not able to tolerate medications used during procedure and would be willing to come back and complete treadmill stress test at a later date. Attempted to contact cardiologist nurse multiple times through perfect serve with no answer patient states she will happy to follow up with cardio as an outpatient

## 2024-12-04 NOTE — DISCHARGE SUMMARY
Physician Discharge Summary          Patient ID:  Flaca Pandya  [id number]  69 y.o.  1955    Date of Admission:  12/3/2024    Date of Discharge:  No discharge date for patient encounter.     Admitting Physician:  Torsten Mercedes M.D.    Primary Diagnosis:    Atherosclerosis of native arteries with claudication     Other Diagnoses:        1.  Essential HTN     Operative Procedures:          History and Physical:    SPreoperative diagnosis:  1.  Atherosclerosis native arteries bilateral lower extremities with severe lifestyle limiting claudication     Postoperative diagnosis: Same     Operative procedure:  1.  Ultrasound-guided cannulation bilateral common femoral arteries with eventual 8 Slovenian sheath placement on the right and 7 Slovenian sheath placement on the left  2.  Ultrasound-guided cannulation left brachial artery with 5/6 Slovenian 105 cm sheath  3.  Suprarenal abdominal aortogram with bilateral iliofemoral arteriogram  4.  Infrarenal abdominal aortic stents (express 10 mm x 37 mm uncovered balloon expandable stent postdilated to 11 mm, [place] VBX balloon expandable covered stents (11 mm x 59 mm, 11 mm x 39 mm))  5.  Bilateral common iliac artery kissing stents (7 mm x 59 mm [place] VBX balloon expandable covered stents)  6.  Right common/external iliac artery stents (express 7 mm x 57 mm, 7 mm x 37 mm balloon expandable uncovered stents, Jenna 7 mm x 60 mm uncovered self-expanding drug-coated stent)  7.  Left common/external iliac artery stents (express 7 mm x 57 mm, 7 mm x 37 mm balloon expandable uncovered stents, Jenna 7 mm x 60 mm uncovered self-expanding drug-coated stent)  8.  Intravascular ultrasound of right iliac artery and abdominal aorta  9.  Pro-glide closure right common femoral artery  10.  Mynx closure left common femoral artery  11.  Completion abdominal aortogram with bilateral iliofemoral arteriogram  12.  Open repair of left brachial artery sheath site     Surgeon: Torsten Mercedes MD

## 2024-12-04 NOTE — PROGRESS NOTES
4 Eyes Skin Assessment     NAME:  Flaca Panday  YOB: 1955  MEDICAL RECORD NUMBER:  [id number]    The patient is being assessed for  Admission    I agree that at least one RN has performed a thorough Head to Toe Skin Assessment on the patient. ALL assessment sites listed below have been assessed.      Areas assessed by both nurses:    Head, Face, Ears, Shoulders, Back, Chest, Arms, Elbows, Hands, Sacrum. Buttock, Coccyx, Ischium, and Legs. Feet and Heels        Does the Patient have a Wound? Yes wound(s) were present on assessment. LDA wound assessment was Initiated and completed by RN bilateral groin punctures postop       Aubrey Prevention initiated by RN: Yes  Wound Care Orders initiated by RN: No    Pressure Injury (Stage 3,4, Unstageable, DTI, NWPT, and Complex wounds) if present, place Wound referral order by RN under : No    New Ostomies, if present place, Ostomy referral order under : No     Nurse 1 eSignature: Electronically signed by Maricel Ludwig RN on 12/3/24 at 11:41 PM CST    **SHARE this note so that the co-signing nurse can place an eSignature**    Nurse 2 eSignature: Electronically signed by Heavenly Tesfaye RN on 12/4/24 at 12:24 AM CST

## 2024-12-04 NOTE — DISCHARGE INSTRUCTIONS
LOWER EXTREMITY ARTERIAL SURGERY HOME INSTRUCTIONS    1.  You may shower after surgery.  The incision should be gently washed with dial antibacterial soap and water once a day.   Then cover with a dry dressing.  Do not use Hibiclens, salve, ointment, or cream on incision lines.    2.  Unless your incision is open, there are no special wound care instructions.  Bruising and discoloration of the area is normal and will disappear in time.  You may also develop a hard \"healing ridge\" under the incisions.  This is a normal part of the healing process.    3.  You may expect some swelling in your leg after surgery, especially when you go home.  This is due to increased blood flow to your leg as well as the trauma from surgery and the intravenous fluids you received during and after surgery.  The swelling can be relieved by elevating your legs.  Think of putting a marble on your foot.  You want your legs to be elevated enough so the marble would roll toward your body and not roll off to the ground.     4.  You should not drive for 48 hours.  Riding in the car is OK.  Going up and down stairs is OK.  No yard work for 2 weeks.  Avoid prolonged standing and sitting with legs down.    5.  Daily walking is encouraged.  We suggest you walk several times each day.  Start walking short distances to avoid fatigue and swelling and increase the distance and length of your walks as your strength increases.      6.  Avoid clothing that may constrict the circulation in your leg, such as tight socks, pantyhose, garters, or girdles.  Wear shoes that fit well and do not rub on your toes, heels, and ankles.       7.  You should inspect your feet, legs, and incision on a daily basis.  Look for small cuts, cracks, and blisters especially on the heel and on an between the toes.  Should dry, flaky skin develop on your feet and legs, the lotions of choice are Triple Lanolin, Eucerin, or Pau.  Do not use an over abundance of lotion and do not

## 2024-12-05 LAB
ECHO AO ASC DIAM: 2.1 CM
ECHO AO ASCENDING AORTA INDEX: 1.43 CM/M2
ECHO AO ROOT DIAM: 1.5 CM
ECHO AO ROOT INDEX: 1.02 CM/M2
ECHO AO SINUS VALSALVA DIAM: 2.3 CM
ECHO AO SINUS VALSALVA INDEX: 1.56 CM/M2
ECHO AO ST JNCT DIAM: 1.7 CM
ECHO AV AREA PEAK VELOCITY: 2.6 CM2
ECHO AV AREA VTI: 2.7 CM2
ECHO AV AREA/BSA PEAK VELOCITY: 1.8 CM2/M2
ECHO AV AREA/BSA VTI: 1.8 CM2/M2
ECHO AV MEAN GRADIENT: 7 MMHG
ECHO AV MEAN VELOCITY: 1.3 M/S
ECHO AV PEAK GRADIENT: 10 MMHG
ECHO AV PEAK VELOCITY: 1.6 M/S
ECHO AV VELOCITY RATIO: 0.81
ECHO AV VTI: 31.7 CM
ECHO BSA: 1.46 M2
ECHO EST RA PRESSURE: 3 MMHG
ECHO IVC PROX: 1.3 CM
ECHO LA AREA 2C: 10 CM2
ECHO LA AREA 4C: 6.6 CM2
ECHO LA DIAMETER INDEX: 1.77 CM/M2
ECHO LA DIAMETER: 2.6 CM
ECHO LA MAJOR AXIS: 2.9 CM
ECHO LA MINOR AXIS: 3.9 CM
ECHO LA TO AORTIC ROOT RATIO: 1.73
ECHO LA VOL MOD A2C: 21 ML (ref 22–52)
ECHO LA VOL MOD A4C: 12 ML (ref 22–52)
ECHO LA VOLUME INDEX MOD A2C: 14 ML/M2 (ref 16–34)
ECHO LA VOLUME INDEX MOD A4C: 8 ML/M2 (ref 16–34)
ECHO LV E' LATERAL VELOCITY: 6.09 CM/S
ECHO LV E' SEPTAL VELOCITY: 4.68 CM/S
ECHO LV EDV 3D: 62 ML
ECHO LV EDV INDEX 3D: 42 ML/M2
ECHO LV EJECTION FRACTION 3D: 72 %
ECHO LV EJECTION FRACTION BIPLANE: 65 % (ref 55–100)
ECHO LV ESV 3D: 18 ML
ECHO LV ESV INDEX 3D: 12 ML/M2
ECHO LV FRACTIONAL SHORTENING: 27 % (ref 28–44)
ECHO LV INTERNAL DIMENSION DIASTOLE INDEX: 2.24 CM/M2
ECHO LV INTERNAL DIMENSION DIASTOLIC: 3.3 CM (ref 3.9–5.3)
ECHO LV INTERNAL DIMENSION SYSTOLIC INDEX: 1.63 CM/M2
ECHO LV INTERNAL DIMENSION SYSTOLIC: 2.4 CM
ECHO LV IVSD: 1.2 CM (ref 0.6–0.9)
ECHO LV MASS 2D: 124.8 G (ref 67–162)
ECHO LV MASS 3D INDEX: 55.8 G/M2
ECHO LV MASS 3D: 82 G
ECHO LV MASS INDEX 2D: 84.9 G/M2 (ref 43–95)
ECHO LV POSTERIOR WALL DIASTOLIC: 1.2 CM (ref 0.6–0.9)
ECHO LV RELATIVE WALL THICKNESS RATIO: 0.73
ECHO LVOT AREA: 3.1 CM2
ECHO LVOT AV VTI INDEX: 0.85
ECHO LVOT DIAM: 2 CM
ECHO LVOT MEAN GRADIENT: 4 MMHG
ECHO LVOT PEAK GRADIENT: 7 MMHG
ECHO LVOT PEAK VELOCITY: 1.3 M/S
ECHO LVOT STROKE VOLUME INDEX: 57.5 ML/M2
ECHO LVOT SV: 84.5 ML
ECHO LVOT VTI: 26.9 CM
ECHO MV A VELOCITY: 1.2 M/S
ECHO MV AREA VTI: 3.8 CM2
ECHO MV E DECELERATION TIME (DT): 251 MS
ECHO MV E VELOCITY: 0.87 M/S
ECHO MV E/A RATIO: 0.73
ECHO MV E/E' LATERAL: 14.29
ECHO MV E/E' RATIO (AVERAGED): 16.44
ECHO MV E/E' SEPTAL: 18.59
ECHO MV LVOT VTI INDEX: 0.84
ECHO MV MAX VELOCITY: 1.2 M/S
ECHO MV MEAN GRADIENT: 2 MMHG
ECHO MV MEAN VELOCITY: 0.7 M/S
ECHO MV PEAK GRADIENT: 6 MMHG
ECHO MV VTI: 22.5 CM
ECHO PULMONARY ARTERY END DIASTOLIC PRESSURE: 7 MMHG
ECHO PV REGURGITANT MAX VELOCITY: 1.3 M/S
ECHO RA AREA 4C: 7.9 CM2
ECHO RA END SYSTOLIC VOLUME APICAL 4 CHAMBER INDEX BSA: 10 ML/M2
ECHO RA VOLUME: 14 ML
ECHO RV BASAL DIMENSION: 2 CM
ECHO RV INTERNAL DIMENSION: 2.5 CM
ECHO RV LONGITUDINAL DIMENSION: 4 CM
ECHO RV MID DIMENSION: 1.4 CM
ECHO RV TAPSE: 2 CM (ref 1.7–?)

## 2024-12-05 NOTE — PROGRESS NOTES
CLINICAL PHARMACY NOTE: MEDS TO BEDS    Total # of Prescriptions Filled: 2   The following medications were delivered to the patient:  Discharge Medication List as of 12/4/2024  2:57 PM        START taking these medications    Details   oxyCODONE-acetaminophen (PERCOCET) 5-325 MG per tablet Take 1 tablet by mouth every 8 hours as needed for Pain for up to 7 days. Intended supply: 5 days. Take lowest dose possible to manage pain Max Daily Amount: 3 tablets, Disp-20 tablet, R-0Normal      Cilostazol 100mg- take 1 tablet by mouth 2 times daily #50         Additional Documentation:  Delivered to patients room and handed to patients family. Paid with card

## 2024-12-06 ENCOUNTER — TELEPHONE (OUTPATIENT)
Dept: CARDIOLOGY CLINIC | Age: 69
End: 2024-12-06

## 2024-12-06 NOTE — TELEPHONE ENCOUNTER
Pt calling to speak with someone regarding getting stress test. Stated while admitted in hospital Dr. Colvin wanted pt to get stress test but pt left before getting test. Want to get stress test order. Please advise.

## 2024-12-06 NOTE — TELEPHONE ENCOUNTER
Called and left v/m for patient that she would need to come in for her follow up first to see NP and then could schedule from there as patient was intended to have lexiscan however patient refused due to not wanting to be injected with the medication but they were unable to reach Dr. Colvin or his nurse Merle to get this switched to stress echo.  Advised to cacll back with any further questions.

## 2024-12-27 LAB — ECHO BSA: 1.46 M2

## 2025-01-30 ENCOUNTER — TRANSCRIBE ORDERS (OUTPATIENT)
Dept: ADMINISTRATIVE | Facility: HOSPITAL | Age: 70
End: 2025-01-30
Payer: COMMERCIAL

## 2025-01-30 DIAGNOSIS — M81.0 AGE-RELATED OSTEOPOROSIS WITHOUT CURRENT PATHOLOGICAL FRACTURE: Primary | ICD-10-CM

## 2025-01-30 DIAGNOSIS — N95.1 MENOPAUSAL AND FEMALE CLIMACTERIC STATES: ICD-10-CM

## 2025-04-22 ENCOUNTER — TRANSCRIBE ORDERS (OUTPATIENT)
Dept: ADMINISTRATIVE | Facility: HOSPITAL | Age: 70
End: 2025-04-22
Payer: COMMERCIAL

## 2025-04-22 DIAGNOSIS — Z12.31 ENCOUNTER FOR SCREENING MAMMOGRAM FOR MALIGNANT NEOPLASM OF BREAST: Primary | ICD-10-CM

## 2025-04-22 DIAGNOSIS — K76.89 OTHER SPECIFIED DISEASES OF LIVER: Primary | ICD-10-CM

## 2025-05-09 LAB — NCCN CRITERIA FLAG: NORMAL

## 2025-05-14 ENCOUNTER — HOSPITAL ENCOUNTER (OUTPATIENT)
Dept: ULTRASOUND IMAGING | Facility: HOSPITAL | Age: 70
Discharge: HOME | End: 2025-05-14
Payer: MEDICARE

## 2025-05-14 ENCOUNTER — HOSPITAL ENCOUNTER (OUTPATIENT)
Dept: BONE DENSITY | Facility: HOSPITAL | Age: 70
Discharge: HOME | End: 2025-05-14
Payer: MEDICARE

## 2025-05-14 ENCOUNTER — HOSPITAL ENCOUNTER (OUTPATIENT)
Dept: MAMMOGRAPHY | Facility: HOSPITAL | Age: 70
Discharge: HOME | End: 2025-05-14
Payer: MEDICARE

## 2025-05-14 DIAGNOSIS — N95.1 MENOPAUSAL AND FEMALE CLIMACTERIC STATES: ICD-10-CM

## 2025-05-14 DIAGNOSIS — Z12.31 ENCOUNTER FOR SCREENING MAMMOGRAM FOR MALIGNANT NEOPLASM OF BREAST: ICD-10-CM

## 2025-05-14 DIAGNOSIS — M81.0 AGE-RELATED OSTEOPOROSIS WITHOUT CURRENT PATHOLOGICAL FRACTURE: ICD-10-CM

## 2025-05-14 DIAGNOSIS — K76.89 OTHER SPECIFIED DISEASES OF LIVER: ICD-10-CM

## 2025-05-14 PROCEDURE — 76705 ECHO EXAM OF ABDOMEN: CPT

## 2025-05-14 PROCEDURE — 77067 SCR MAMMO BI INCL CAD: CPT

## 2025-05-14 PROCEDURE — 77080 DXA BONE DENSITY AXIAL: CPT

## 2025-05-14 PROCEDURE — 77063 BREAST TOMOSYNTHESIS BI: CPT

## 2025-05-22 ENCOUNTER — OFFICE VISIT (OUTPATIENT)
Dept: CARDIOLOGY | Facility: CLINIC | Age: 70
End: 2025-05-22
Payer: MEDICARE

## 2025-05-22 VITALS
DIASTOLIC BLOOD PRESSURE: 84 MMHG | SYSTOLIC BLOOD PRESSURE: 170 MMHG | BODY MASS INDEX: 20.43 KG/M2 | WEIGHT: 111 LBS | OXYGEN SATURATION: 98 % | HEART RATE: 84 BPM | HEIGHT: 62 IN

## 2025-05-22 DIAGNOSIS — I20.89 STABLE ANGINA PECTORIS: Primary | ICD-10-CM

## 2025-05-22 DIAGNOSIS — I73.9 PVD (PERIPHERAL VASCULAR DISEASE) WITH CLAUDICATION: ICD-10-CM

## 2025-05-22 DIAGNOSIS — I47.10 PAROXYSMAL SVT (SUPRAVENTRICULAR TACHYCARDIA): ICD-10-CM

## 2025-05-22 DIAGNOSIS — E78.2 MIXED HYPERLIPIDEMIA: ICD-10-CM

## 2025-05-22 PROCEDURE — 93000 ELECTROCARDIOGRAM COMPLETE: CPT | Performed by: EMERGENCY MEDICINE

## 2025-05-22 PROCEDURE — 99204 OFFICE O/P NEW MOD 45 MIN: CPT | Performed by: EMERGENCY MEDICINE

## 2025-05-22 RX ORDER — METOPROLOL TARTRATE 25 MG/1
50 TABLET, FILM COATED ORAL ONCE
OUTPATIENT
Start: 2025-05-22

## 2025-05-22 RX ORDER — ALIROCUMAB 75 MG/ML
75 INJECTION, SOLUTION SUBCUTANEOUS
Qty: 2.24 ML | Refills: 10 | Status: SHIPPED | OUTPATIENT
Start: 2025-05-22

## 2025-05-22 RX ORDER — ASPIRIN 81 MG/1
81 TABLET ORAL DAILY
Qty: 90 TABLET | Refills: 3 | Status: SHIPPED | OUTPATIENT
Start: 2025-05-22

## 2025-05-22 RX ORDER — SODIUM CHLORIDE 9 MG/ML
40 INJECTION, SOLUTION INTRAVENOUS AS NEEDED
OUTPATIENT
Start: 2025-05-22

## 2025-05-22 RX ORDER — DICLOFENAC SODIUM 75 MG/1
TABLET, DELAYED RELEASE ORAL EVERY 12 HOURS SCHEDULED
COMMUNITY

## 2025-05-22 RX ORDER — CLOPIDOGREL BISULFATE 75 MG
75 TABLET ORAL DAILY
COMMUNITY

## 2025-05-22 RX ORDER — CILOSTAZOL 100 MG/1
TABLET ORAL EVERY 12 HOURS SCHEDULED
COMMUNITY
Start: 2024-12-04

## 2025-05-22 RX ORDER — FLUTICASONE FUROATE, UMECLIDINIUM BROMIDE AND VILANTEROL TRIFENATATE 100; 62.5; 25 UG/1; UG/1; UG/1
POWDER RESPIRATORY (INHALATION)
COMMUNITY
Start: 2025-05-16

## 2025-05-22 RX ORDER — GABAPENTIN 100 MG/1
1 CAPSULE ORAL EVERY 24 HOURS
COMMUNITY

## 2025-05-22 RX ORDER — METOPROLOL TARTRATE 1 MG/ML
5 INJECTION, SOLUTION INTRAVENOUS
OUTPATIENT
Start: 2025-05-22

## 2025-05-22 RX ORDER — METOPROLOL TARTRATE 25 MG/1
100 TABLET, FILM COATED ORAL ONCE
OUTPATIENT
Start: 2025-05-22

## 2025-05-22 RX ORDER — NITROGLYCERIN 0.4 MG/1
0.8 TABLET SUBLINGUAL
OUTPATIENT
Start: 2025-05-22

## 2025-05-22 RX ORDER — TRAZODONE HYDROCHLORIDE 100 MG/1
TABLET ORAL EVERY 24 HOURS
COMMUNITY

## 2025-05-22 RX ORDER — NITROGLYCERIN 0.4 MG/1
0.4 TABLET SUBLINGUAL
OUTPATIENT
Start: 2025-05-22 | End: 2025-05-22

## 2025-05-22 RX ORDER — SODIUM CHLORIDE 0.9 % (FLUSH) 0.9 %
10 SYRINGE (ML) INJECTION AS NEEDED
OUTPATIENT
Start: 2025-05-22

## 2025-05-22 RX ORDER — BUTALBITAL, ACETAMINOPHEN AND CAFFEINE 50; 325; 40 MG/1; MG/1; MG/1
1 TABLET ORAL EVERY 4 HOURS PRN
COMMUNITY

## 2025-05-22 RX ORDER — METOPROLOL TARTRATE 50 MG
50 TABLET ORAL
OUTPATIENT
Start: 2025-05-22

## 2025-05-22 RX ORDER — METOPROLOL TARTRATE 50 MG
TABLET ORAL
Qty: 2 TABLET | Refills: 0 | Status: SHIPPED | OUTPATIENT
Start: 2025-05-22

## 2025-05-22 RX ORDER — SODIUM CHLORIDE 0.9 % (FLUSH) 0.9 %
10 SYRINGE (ML) INJECTION EVERY 12 HOURS SCHEDULED
OUTPATIENT
Start: 2025-05-22

## 2025-05-22 RX ORDER — METOPROLOL TARTRATE 25 MG/1
150 TABLET, FILM COATED ORAL ONCE
OUTPATIENT
Start: 2025-05-22

## 2025-05-22 RX ORDER — EZETIMIBE 10 MG/1
10 TABLET ORAL DAILY
COMMUNITY

## 2025-05-22 RX ORDER — METOPROLOL TARTRATE 25 MG/1
200 TABLET, FILM COATED ORAL ONCE
OUTPATIENT
Start: 2025-05-22 | End: 2025-05-22

## 2025-05-22 NOTE — PROGRESS NOTES
Subjective:     Encounter Date:05/22/2025      Patient ID: Nathaly Bustillos is a 70 y.o. female.    Chief Complaint:  History of Present Illness  History of Present Illness  The patient presents for evaluation of atrial fibrillation.    She began experiencing difficulty in ambulation post-COVID-19, attributing it to inadequate oxygen supply to her lower extremities. Despite consultations with pulmonary and orthopedic specialists, no definitive diagnosis was made. After 3 years, her primary care physician recommended a vascular assessment, which revealed the need for 11 stents in her aorta and bilateral legs. These were successfully placed by Dr. Jignesh Mobley at [facility]. During this period, she experienced an episode of atrial fibrillation, prompting a referral to Dr. Donnie Jameson. However, due to her 's concurrent health issues, she was unable to undergo further testing. A heart monitor was placed, but she has not yet reviewed the results. She reports no cardiac stents. She is currently on Pletal, Plavix 75 mg, and Zetia, but not on aspirin or statins due to a family history of leg aches and muscle cramps associated with these medications. Her primary care physician, Dr. Ronda Umana, recently conducted a blood workup.    She also reports dyspnea, which she believes is related to her early-stage COPD. She recalls a previous stress test that induced hyperventilation and vomiting, leading to her refusal of a Lexiscan injection.    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    The following portions of the patient's history were reviewed and updated as appropriate: allergies, current medications, past family history, past medical history, past social history, past surgical history, and problem list.    Review  of Systems   Constitutional: Positive for malaise/fatigue. Negative for diaphoresis and fever.   HENT:  Negative for congestion.    Eyes:  Negative for vision loss in left eye and vision loss in right eye.   Cardiovascular:  Positive for chest pain, claudication, dyspnea on exertion and palpitations. Negative for irregular heartbeat, leg swelling, orthopnea and syncope.   Respiratory:  Negative for cough, shortness of breath and wheezing.    Hematologic/Lymphatic: Negative for adenopathy.   Skin:  Negative for rash.   Musculoskeletal:  Negative for joint pain and joint swelling.   Gastrointestinal:  Negative for abdominal pain, diarrhea, nausea and vomiting.   Neurological:  Negative for excessive daytime sleepiness, dizziness, focal weakness, light-headedness, numbness and weakness.   Psychiatric/Behavioral:  Negative for depression. The patient does not have insomnia.            Current Outpatient Medications:     butalbital-acetaminophen-caffeine (FIORICET, ESGIC) -40 MG per tablet, Take 1 tablet by mouth Every 4 (Four) Hours As Needed for Headache., Disp: , Rfl:     cilostazol (PLETAL) 100 MG tablet, Every 12 (Twelve) Hours., Disp: , Rfl:     diclofenac (VOLTAREN) 75 MG EC tablet, Every 12 (Twelve) Hours., Disp: , Rfl:     ezetimibe (ZETIA) 10 MG tablet, Take 1 tablet by mouth Daily., Disp: , Rfl:     gabapentin (NEURONTIN) 100 MG capsule, 1 capsule Daily., Disp: , Rfl:     Plavix 75 MG tablet, Take 1 tablet by mouth Daily., Disp: , Rfl:     traZODone (DESYREL) 100 MG tablet, Daily., Disp: , Rfl:     Trelegy Ellipta 100-62.5-25 MCG/ACT inhaler, , Disp: , Rfl:     Alirocumab (Praluent) 75 MG/ML solution auto-injector, Inject 1 mL under the skin into the appropriate area as directed Every 14 (Fourteen) Days., Disp: 2.24 mL, Rfl: 10    aspirin 81 MG EC tablet, Take 1 tablet by mouth Daily., Disp: 90 tablet, Rfl: 3    metoprolol tartrate (LOPRESSOR) 50 MG tablet, Take 50 mg at Bedtime the Night Before Coronary  CTA Appointment and In the Morning 1 Hour Prior to Coronary CTA Appointment. Do not take if heart rate less than 60., Disp: 2 tablet, Rfl: 0       Objective:      Vitals:    05/22/25 1402   BP: 170/84   Pulse: 84   SpO2: 98%     Vitals and nursing note reviewed.   Constitutional:       Appearance: Normal and healthy appearance. Well-developed and not in distress.   Eyes:      Extraocular Movements: Extraocular movements intact.      Pupils: Pupils are equal, round, and reactive to light.   HENT:      Head: Normocephalic and atraumatic.    Mouth/Throat:      Pharynx: Oropharynx is clear.   Neck:      Vascular: JVD normal.      Trachea: Trachea normal.   Pulmonary:      Effort: Pulmonary effort is normal.      Breath sounds: Normal breath sounds. No wheezing. No rhonchi. No rales.   Cardiovascular:      PMI at left midclavicular line. Normal rate. Regular rhythm. Normal S1. Normal S2.       Murmurs: There is a grade 2/6 systolic murmur.      No gallop.  No click. No rub.   Pulses:     Decreased pulses.      Dorsalis pedis: 2+ bilaterally.     Posterior tibial: 2+ bilaterally.  Abdominal:      General: Bowel sounds are normal.      Palpations: Abdomen is soft.      Tenderness: There is no abdominal tenderness.   Musculoskeletal: Normal range of motion.      Cervical back: Normal range of motion and neck supple. Skin:     General: Skin is warm and dry.      Capillary Refill: Capillary refill takes less than 2 seconds.   Feet:      Right foot:      Skin integrity: Skin integrity normal.      Left foot:      Skin integrity: Skin integrity normal.   Neurological:      Mental Status: Alert and oriented to person, place and time.      Sensory: Sensation is intact.      Motor: Motor function is intact.      Coordination: Coordination is intact.   Psychiatric:         Speech: Speech normal.         Behavior: Behavior is cooperative.       Physical Exam      Lab Review:         ECG 12 Lead    Date/Time: 5/22/2025 4:01  PM  Performed by: Joe Nugent DO    Authorized by: Joe Nugent DO  Comparison: not compared with previous ECG   Rhythm: sinus rhythm  Rate: normal  QRS axis: right    Clinical impression: abnormal EKG        Results  Imaging   - Echocardiogram: 12/2024, Normal heart function and valves    Diagnostic Testing   - Heart monitor: Normal sinus rhythm as the dominant rhythm, average rate was 72, no A-fib, 3 runs of SVT with the longest lasting 28 beats, rare PVCs, sinus tach, PACs and supraventricular tach    Assessment/Plan:     Problem List Items Addressed This Visit       Stable angina pectoris - Primary    Relevant Medications    Plavix 75 MG tablet    cilostazol (PLETAL) 100 MG tablet    metoprolol tartrate (LOPRESSOR) 50 MG tablet    Other Relevant Orders    CT Angiogram Coronary    CT Angiogram Coronary-Cardiology Interpretation    No Caffeine or Nicotine 4 Hours Prior to CTA Appointment    Nothing to Eat or Drink 4 Hours Prior to CTA Appointment    Do Not Take Phosphodiasterase Inhibitors in the 72 Hours Prior to Coronary CTA    Mixed hyperlipidemia    Relevant Medications    ezetimibe (ZETIA) 10 MG tablet    Alirocumab (Praluent) 75 MG/ML solution auto-injector    PVD (peripheral vascular disease) with claudication    Paroxysmal SVT (supraventricular tachycardia)    Relevant Medications    Plavix 75 MG tablet    cilostazol (PLETAL) 100 MG tablet    metoprolol tartrate (LOPRESSOR) 50 MG tablet     Assessment & Plan  1. Supraventricular Tachycardia:  - Coronary CT angiogram to check for any blockages.  - Start aspirin 81 mg daily to prevent stent thrombosis.  - Continue current medications: Plavix 75 mg and Pletal.  - Discussed risks, benefits, and alternatives of treatment, including the importance of dual antiplatelet therapy to keep stents open long-term and the potential side effects such as GI bleeds or nose bleeds.    2. Hyperlipidemia:  - Start Praluent 225 mg every 14 days due  to intolerance to statins (myalgias).  - Discussed risks, benefits, and alternatives of treatment, emphasizing the necessity of anticholesterol medication to prevent stent occlusion.    3. Chronic Obstructive Pulmonary Disease (COPD):  - Patient experiences shortness of breath, likely related to COPD.    Follow-up:  - Follow up in 3 months. If coronary CT angiogram results are abnormal, an earlier appointment will be scheduled.      Recommendations/plans:    Transcribed from ambient dictation for Joe Nugent DO by Joe Nugent DO.  05/22/25   16:00 CDT    Patient or patient representative verbalized consent for the use of Ambient Listening during the visit with  Joe Nugent DO for chart documentation. 5/22/2025  16:00 CDT

## 2025-06-02 ENCOUNTER — TELEPHONE (OUTPATIENT)
Dept: CT IMAGING | Facility: HOSPITAL | Age: 70
End: 2025-06-02
Payer: MEDICARE

## 2025-06-03 ENCOUNTER — HOSPITAL ENCOUNTER (OUTPATIENT)
Dept: CT IMAGING | Facility: HOSPITAL | Age: 70
Discharge: HOME | End: 2025-06-03
Payer: MEDICARE

## 2025-06-03 VITALS
HEART RATE: 75 BPM | SYSTOLIC BLOOD PRESSURE: 129 MMHG | WEIGHT: 113.3 LBS | HEIGHT: 61 IN | DIASTOLIC BLOOD PRESSURE: 68 MMHG | OXYGEN SATURATION: 96 % | TEMPERATURE: 98.2 F | BODY MASS INDEX: 21.39 KG/M2 | RESPIRATION RATE: 10 BRPM

## 2025-06-03 DIAGNOSIS — I20.89 STABLE ANGINA PECTORIS: ICD-10-CM

## 2025-06-03 LAB — CREAT BLDA-MCNC: 1.1 MG/DL (ref 0.6–1.3)

## 2025-06-03 PROCEDURE — A9270 NON-COVERED ITEM OR SERVICE: HCPCS | Performed by: EMERGENCY MEDICINE

## 2025-06-03 PROCEDURE — 25510000001 IOPAMIDOL PER 1 ML: Performed by: EMERGENCY MEDICINE

## 2025-06-03 PROCEDURE — 82565 ASSAY OF CREATININE: CPT | Performed by: EMERGENCY MEDICINE

## 2025-06-03 PROCEDURE — 75574 CT ANGIO HRT W/3D IMAGE: CPT

## 2025-06-03 PROCEDURE — 63710000001 NITROGLYCERIN 0.4 MG SUBLINGUAL TABLET: Performed by: EMERGENCY MEDICINE

## 2025-06-03 RX ORDER — METOPROLOL TARTRATE 50 MG
50 TABLET ORAL ONCE
Status: DISCONTINUED | OUTPATIENT
Start: 2025-06-03 | End: 2025-06-04 | Stop reason: HOSPADM

## 2025-06-03 RX ORDER — SODIUM CHLORIDE 0.9 % (FLUSH) 0.9 %
10 SYRINGE (ML) INJECTION EVERY 12 HOURS SCHEDULED
Status: DISCONTINUED | OUTPATIENT
Start: 2025-06-03 | End: 2025-06-04 | Stop reason: HOSPADM

## 2025-06-03 RX ORDER — IOPAMIDOL 755 MG/ML
100 INJECTION, SOLUTION INTRAVASCULAR
Status: COMPLETED | OUTPATIENT
Start: 2025-06-03 | End: 2025-06-03

## 2025-06-03 RX ORDER — METOPROLOL TARTRATE 1 MG/ML
5 INJECTION, SOLUTION INTRAVENOUS
Status: DISCONTINUED | OUTPATIENT
Start: 2025-06-03 | End: 2025-06-04 | Stop reason: HOSPADM

## 2025-06-03 RX ORDER — METOPROLOL TARTRATE 100 MG/1
100 TABLET ORAL ONCE
Status: DISCONTINUED | OUTPATIENT
Start: 2025-06-03 | End: 2025-06-04 | Stop reason: HOSPADM

## 2025-06-03 RX ORDER — NITROGLYCERIN 0.4 MG/1
0.8 TABLET SUBLINGUAL
Status: COMPLETED | OUTPATIENT
Start: 2025-06-03 | End: 2025-06-03

## 2025-06-03 RX ORDER — NITROGLYCERIN 0.4 MG/1
0.4 TABLET SUBLINGUAL
Status: COMPLETED | OUTPATIENT
Start: 2025-06-03 | End: 2025-06-03

## 2025-06-03 RX ORDER — SODIUM CHLORIDE 9 MG/ML
40 INJECTION, SOLUTION INTRAVENOUS AS NEEDED
Status: DISCONTINUED | OUTPATIENT
Start: 2025-06-03 | End: 2025-06-04 | Stop reason: HOSPADM

## 2025-06-03 RX ORDER — METOPROLOL TARTRATE 100 MG/1
200 TABLET ORAL ONCE
Status: DISCONTINUED | OUTPATIENT
Start: 2025-06-03 | End: 2025-06-04 | Stop reason: HOSPADM

## 2025-06-03 RX ORDER — SODIUM CHLORIDE 0.9 % (FLUSH) 0.9 %
10 SYRINGE (ML) INJECTION AS NEEDED
Status: DISCONTINUED | OUTPATIENT
Start: 2025-06-03 | End: 2025-06-04 | Stop reason: HOSPADM

## 2025-06-03 RX ORDER — METOPROLOL TARTRATE 50 MG
50 TABLET ORAL
Status: DISCONTINUED | OUTPATIENT
Start: 2025-06-03 | End: 2025-06-04 | Stop reason: HOSPADM

## 2025-06-03 RX ADMIN — NITROGLYCERIN 0.8 MG: 0.4 TABLET SUBLINGUAL at 14:14

## 2025-06-03 RX ADMIN — IOPAMIDOL 100 ML: 755 INJECTION, SOLUTION INTRAVENOUS at 14:48

## 2025-06-27 ENCOUNTER — HOSPITAL ENCOUNTER (OUTPATIENT)
Dept: CT IMAGING | Facility: HOSPITAL | Age: 70
Discharge: HOME | End: 2025-06-27
Payer: MEDICARE

## 2025-06-27 DIAGNOSIS — R93.1 ABNORMAL FINDINGS ON DIAGNOSTIC IMAGING OF HEART AND CORONARY CIRCULATION: Primary | ICD-10-CM

## 2025-06-30 ENCOUNTER — HOSPITAL ENCOUNTER (OUTPATIENT)
Dept: CT IMAGING | Facility: HOSPITAL | Age: 70
Discharge: HOME | End: 2025-06-30
Admitting: INTERNAL MEDICINE
Payer: MEDICARE

## 2025-06-30 PROCEDURE — 75580 N-INVAS EST C FFR SW ALY CTA: CPT

## 2025-06-30 PROCEDURE — 75580 N-INVAS EST C FFR SW ALY CTA: CPT | Performed by: INTERNAL MEDICINE

## 2025-07-03 DIAGNOSIS — R93.1 ABNORMAL FINDINGS ON DIAGNOSTIC IMAGING OF HEART AND CORONARY CIRCULATION: Primary | ICD-10-CM

## 2025-07-03 DIAGNOSIS — I20.89 STABLE ANGINA PECTORIS: ICD-10-CM

## 2025-07-21 ENCOUNTER — HOSPITAL ENCOUNTER (OUTPATIENT)
Facility: HOSPITAL | Age: 70
Discharge: HOME | End: 2025-07-22
Attending: EMERGENCY MEDICINE | Admitting: EMERGENCY MEDICINE
Payer: MEDICARE

## 2025-07-21 DIAGNOSIS — R93.1 ABNORMAL FINDINGS ON DIAGNOSTIC IMAGING OF HEART AND CORONARY CIRCULATION: ICD-10-CM

## 2025-07-21 DIAGNOSIS — I25.10 CAD S/P PERCUTANEOUS CORONARY ANGIOPLASTY: Primary | ICD-10-CM

## 2025-07-21 DIAGNOSIS — I47.10 PAROXYSMAL SVT (SUPRAVENTRICULAR TACHYCARDIA): ICD-10-CM

## 2025-07-21 DIAGNOSIS — Z98.61 CAD S/P PERCUTANEOUS CORONARY ANGIOPLASTY: Primary | ICD-10-CM

## 2025-07-21 DIAGNOSIS — I20.89 STABLE ANGINA PECTORIS: ICD-10-CM

## 2025-07-21 LAB
ALBUMIN SERPL-MCNC: 4.4 G/DL (ref 3.5–5.2)
ALBUMIN/GLOB SERPL: 1.7 G/DL
ALP SERPL-CCNC: 83 U/L (ref 39–117)
ALT SERPL W P-5'-P-CCNC: 15 U/L (ref 1–33)
ANION GAP SERPL CALCULATED.3IONS-SCNC: 12 MMOL/L (ref 5–15)
AST SERPL-CCNC: 23 U/L (ref 1–32)
BASOPHILS # BLD AUTO: 0.05 10*3/MM3 (ref 0–0.2)
BASOPHILS NFR BLD AUTO: 0.8 % (ref 0–1.5)
BILIRUB SERPL-MCNC: 0.6 MG/DL (ref 0–1.2)
BUN SERPL-MCNC: 13.9 MG/DL (ref 8–23)
BUN/CREAT SERPL: 15.4 (ref 7–25)
CALCIUM SPEC-SCNC: 9.3 MG/DL (ref 8.6–10.5)
CHLORIDE SERPL-SCNC: 99 MMOL/L (ref 98–107)
CO2 SERPL-SCNC: 23 MMOL/L (ref 22–29)
CREAT SERPL-MCNC: 0.9 MG/DL (ref 0.57–1)
DEPRECATED RDW RBC AUTO: 46.3 FL (ref 37–54)
EGFRCR SERPLBLD CKD-EPI 2021: 68.5 ML/MIN/1.73
EOSINOPHIL # BLD AUTO: 0.09 10*3/MM3 (ref 0–0.4)
EOSINOPHIL NFR BLD AUTO: 1.5 % (ref 0.3–6.2)
ERYTHROCYTE [DISTWIDTH] IN BLOOD BY AUTOMATED COUNT: 13.2 % (ref 12.3–15.4)
GLOBULIN UR ELPH-MCNC: 2.6 GM/DL
GLUCOSE SERPL-MCNC: 99 MG/DL (ref 65–99)
HCT VFR BLD AUTO: 37.8 % (ref 34–46.6)
HGB BLD-MCNC: 12.6 G/DL (ref 12–15.9)
IMM GRANULOCYTES # BLD AUTO: 0.01 10*3/MM3 (ref 0–0.05)
IMM GRANULOCYTES NFR BLD AUTO: 0.2 % (ref 0–0.5)
INR PPP: 1.02 (ref 0.91–1.09)
LYMPHOCYTES # BLD AUTO: 1.31 10*3/MM3 (ref 0.7–3.1)
LYMPHOCYTES NFR BLD AUTO: 21.9 % (ref 19.6–45.3)
MCH RBC QN AUTO: 31.5 PG (ref 26.6–33)
MCHC RBC AUTO-ENTMCNC: 33.3 G/DL (ref 31.5–35.7)
MCV RBC AUTO: 94.5 FL (ref 79–97)
MONOCYTES # BLD AUTO: 0.56 10*3/MM3 (ref 0.1–0.9)
MONOCYTES NFR BLD AUTO: 9.3 % (ref 5–12)
NEUTROPHILS NFR BLD AUTO: 3.97 10*3/MM3 (ref 1.7–7)
NEUTROPHILS NFR BLD AUTO: 66.3 % (ref 42.7–76)
NRBC BLD AUTO-RTO: 0 /100 WBC (ref 0–0.2)
PLATELET # BLD AUTO: 296 10*3/MM3 (ref 140–450)
PMV BLD AUTO: 9.5 FL (ref 6–12)
POTASSIUM SERPL-SCNC: 4 MMOL/L (ref 3.5–5.2)
PROT SERPL-MCNC: 7 G/DL (ref 6–8.5)
PROTHROMBIN TIME: 13.9 SECONDS (ref 11.8–14.8)
RBC # BLD AUTO: 4 10*6/MM3 (ref 3.77–5.28)
SODIUM SERPL-SCNC: 134 MMOL/L (ref 136–145)
WBC NRBC COR # BLD AUTO: 5.99 10*3/MM3 (ref 3.4–10.8)

## 2025-07-21 PROCEDURE — 94761 N-INVAS EAR/PLS OXIMETRY MLT: CPT

## 2025-07-21 PROCEDURE — 80053 COMPREHEN METABOLIC PANEL: CPT | Performed by: EMERGENCY MEDICINE

## 2025-07-21 PROCEDURE — 25010000002 DIPHENHYDRAMINE PER 50 MG: Performed by: EMERGENCY MEDICINE

## 2025-07-21 PROCEDURE — 99153 MOD SED SAME PHYS/QHP EA: CPT | Performed by: EMERGENCY MEDICINE

## 2025-07-21 PROCEDURE — C1894 INTRO/SHEATH, NON-LASER: HCPCS | Performed by: EMERGENCY MEDICINE

## 2025-07-21 PROCEDURE — 93005 ELECTROCARDIOGRAM TRACING: CPT | Performed by: EMERGENCY MEDICINE

## 2025-07-21 PROCEDURE — 25010000002 HYDRALAZINE PER 20 MG: Performed by: EMERGENCY MEDICINE

## 2025-07-21 PROCEDURE — A9270 NON-COVERED ITEM OR SERVICE: HCPCS | Performed by: EMERGENCY MEDICINE

## 2025-07-21 PROCEDURE — 94664 DEMO&/EVAL PT USE INHALER: CPT

## 2025-07-21 PROCEDURE — C1725 CATH, TRANSLUMIN NON-LASER: HCPCS | Performed by: EMERGENCY MEDICINE

## 2025-07-21 PROCEDURE — 85610 PROTHROMBIN TIME: CPT | Performed by: EMERGENCY MEDICINE

## 2025-07-21 PROCEDURE — C1874 STENT, COATED/COV W/DEL SYS: HCPCS | Performed by: EMERGENCY MEDICINE

## 2025-07-21 PROCEDURE — 25510000001 IOPAMIDOL PER 1 ML: Performed by: EMERGENCY MEDICINE

## 2025-07-21 PROCEDURE — C1760 CLOSURE DEV, VASC: HCPCS | Performed by: EMERGENCY MEDICINE

## 2025-07-21 PROCEDURE — 93454 CORONARY ARTERY ANGIO S&I: CPT | Performed by: EMERGENCY MEDICINE

## 2025-07-21 PROCEDURE — 25010000002 HEPARIN (PORCINE) 2000-0.9 UNIT/L-% SOLUTION: Performed by: EMERGENCY MEDICINE

## 2025-07-21 PROCEDURE — 94799 UNLISTED PULMONARY SVC/PX: CPT

## 2025-07-21 PROCEDURE — 25010000002 BIVALIRUDIN TRIFLUOROACETATE 250 MG RECONSTITUTED SOLUTION 1 EACH VIAL: Performed by: EMERGENCY MEDICINE

## 2025-07-21 PROCEDURE — 25010000002 HEPARIN (PORCINE) 1000-0.9 UT/500ML-% SOLUTION: Performed by: EMERGENCY MEDICINE

## 2025-07-21 PROCEDURE — 63710000001 ACETAMINOPHEN 325 MG TABLET: Performed by: EMERGENCY MEDICINE

## 2025-07-21 PROCEDURE — 92928 PRQ TCAT PLMT NTRAC ST 1 LES: CPT | Performed by: EMERGENCY MEDICINE

## 2025-07-21 PROCEDURE — 25010000002 FENTANYL CITRATE (PF) 50 MCG/ML SOLUTION: Performed by: EMERGENCY MEDICINE

## 2025-07-21 PROCEDURE — 25010000002 MIDAZOLAM HCL (PF) 5 MG/5ML SOLUTION: Performed by: EMERGENCY MEDICINE

## 2025-07-21 PROCEDURE — G0378 HOSPITAL OBSERVATION PER HR: HCPCS

## 2025-07-21 PROCEDURE — 25810000003 SODIUM CHLORIDE 0.9 % SOLUTION: Performed by: EMERGENCY MEDICINE

## 2025-07-21 PROCEDURE — 25010000002 FENTANYL CITRATE (PF) 100 MCG/2ML SOLUTION: Performed by: EMERGENCY MEDICINE

## 2025-07-21 PROCEDURE — 93458 L HRT ARTERY/VENTRICLE ANGIO: CPT | Performed by: EMERGENCY MEDICINE

## 2025-07-21 PROCEDURE — 63710000001 TRAZODONE 100 MG TABLET: Performed by: EMERGENCY MEDICINE

## 2025-07-21 PROCEDURE — C9600 PERC DRUG-EL COR STENT SING: HCPCS | Performed by: EMERGENCY MEDICINE

## 2025-07-21 PROCEDURE — 25010000002 ONDANSETRON PER 1 MG

## 2025-07-21 PROCEDURE — C1769 GUIDE WIRE: HCPCS | Performed by: EMERGENCY MEDICINE

## 2025-07-21 PROCEDURE — 63710000001 NAPROXEN 250 MG TABLET: Performed by: EMERGENCY MEDICINE

## 2025-07-21 PROCEDURE — 99152 MOD SED SAME PHYS/QHP 5/>YRS: CPT | Performed by: EMERGENCY MEDICINE

## 2025-07-21 PROCEDURE — S0260 H&P FOR SURGERY: HCPCS | Performed by: EMERGENCY MEDICINE

## 2025-07-21 PROCEDURE — 93010 ELECTROCARDIOGRAM REPORT: CPT | Performed by: INTERNAL MEDICINE

## 2025-07-21 PROCEDURE — 94640 AIRWAY INHALATION TREATMENT: CPT

## 2025-07-21 PROCEDURE — 63710000001 CILOSTAZOL 100 MG TABLET: Performed by: EMERGENCY MEDICINE

## 2025-07-21 PROCEDURE — C1887 CATHETER, GUIDING: HCPCS | Performed by: EMERGENCY MEDICINE

## 2025-07-21 PROCEDURE — 25010000002 LIDOCAINE 2% SOLUTION: Performed by: EMERGENCY MEDICINE

## 2025-07-21 PROCEDURE — 85025 COMPLETE CBC W/AUTO DIFF WBC: CPT | Performed by: EMERGENCY MEDICINE

## 2025-07-21 DEVICE — XIENCE SKYPOINT™ EVEROLIMUS ELUTING CORONARY STENT SYSTEM 3.50 MM X 18 MM / RAPID-EXCHANGE
Type: IMPLANTABLE DEVICE | Site: CORONARY | Status: FUNCTIONAL
Brand: XIENCE SKYPOINT™

## 2025-07-21 RX ORDER — HEPARIN SODIUM 200 [USP'U]/100ML
INJECTION, SOLUTION INTRAVENOUS
Status: DISCONTINUED | OUTPATIENT
Start: 2025-07-21 | End: 2025-07-21 | Stop reason: HOSPADM

## 2025-07-21 RX ORDER — FENTANYL CITRATE 50 UG/ML
INJECTION, SOLUTION INTRAMUSCULAR; INTRAVENOUS
Status: DISCONTINUED | OUTPATIENT
Start: 2025-07-21 | End: 2025-07-21 | Stop reason: HOSPADM

## 2025-07-21 RX ORDER — ARFORMOTEROL TARTRATE 15 UG/2ML
15 SOLUTION RESPIRATORY (INHALATION)
Status: DISCONTINUED | OUTPATIENT
Start: 2025-07-21 | End: 2025-07-22 | Stop reason: HOSPADM

## 2025-07-21 RX ORDER — DILTIAZEM HCL/D5W 125 MG/125
5-15 PLASTIC BAG, INJECTION (ML) INTRAVENOUS
Status: DISCONTINUED | OUTPATIENT
Start: 2025-07-21 | End: 2025-07-22 | Stop reason: HOSPADM

## 2025-07-21 RX ORDER — BUTALBITAL, ACETAMINOPHEN AND CAFFEINE 50; 325; 40 MG/1; MG/1; MG/1
1 TABLET ORAL EVERY 4 HOURS PRN
Status: DISCONTINUED | OUTPATIENT
Start: 2025-07-21 | End: 2025-07-22 | Stop reason: HOSPADM

## 2025-07-21 RX ORDER — ONDANSETRON 2 MG/ML
INJECTION INTRAMUSCULAR; INTRAVENOUS
Status: COMPLETED
Start: 2025-07-21 | End: 2025-07-21

## 2025-07-21 RX ORDER — DIPHENHYDRAMINE HYDROCHLORIDE 50 MG/ML
INJECTION, SOLUTION INTRAMUSCULAR; INTRAVENOUS
Status: DISCONTINUED | OUTPATIENT
Start: 2025-07-21 | End: 2025-07-21 | Stop reason: HOSPADM

## 2025-07-21 RX ORDER — LIDOCAINE HYDROCHLORIDE 20 MG/ML
INJECTION, SOLUTION INFILTRATION; PERINEURAL
Status: DISCONTINUED | OUTPATIENT
Start: 2025-07-21 | End: 2025-07-21 | Stop reason: HOSPADM

## 2025-07-21 RX ORDER — GABAPENTIN 100 MG/1
100 CAPSULE ORAL 4 TIMES DAILY PRN
Status: DISCONTINUED | OUTPATIENT
Start: 2025-07-21 | End: 2025-07-22 | Stop reason: HOSPADM

## 2025-07-21 RX ORDER — GABAPENTIN 100 MG/1
100 CAPSULE ORAL EVERY 24 HOURS
Status: DISCONTINUED | OUTPATIENT
Start: 2025-07-22 | End: 2025-07-21

## 2025-07-21 RX ORDER — NAPROXEN 250 MG/1
250 TABLET ORAL 2 TIMES DAILY WITH MEALS
Status: DISCONTINUED | OUTPATIENT
Start: 2025-07-21 | End: 2025-07-22 | Stop reason: HOSPADM

## 2025-07-21 RX ORDER — SODIUM CHLORIDE 0.9 % (FLUSH) 0.9 %
10 SYRINGE (ML) INJECTION AS NEEDED
Status: DISCONTINUED | OUTPATIENT
Start: 2025-07-21 | End: 2025-07-22 | Stop reason: HOSPADM

## 2025-07-21 RX ORDER — CLOPIDOGREL BISULFATE 75 MG/1
75 TABLET ORAL DAILY
Status: DISCONTINUED | OUTPATIENT
Start: 2025-07-22 | End: 2025-07-22 | Stop reason: HOSPADM

## 2025-07-21 RX ORDER — SODIUM CHLORIDE 9 MG/ML
1 INJECTION, SOLUTION INTRAVENOUS CONTINUOUS
Status: DISPENSED | OUTPATIENT
Start: 2025-07-21 | End: 2025-07-21

## 2025-07-21 RX ORDER — TRAZODONE HYDROCHLORIDE 100 MG/1
100 TABLET ORAL NIGHTLY
Status: DISCONTINUED | OUTPATIENT
Start: 2025-07-21 | End: 2025-07-22 | Stop reason: HOSPADM

## 2025-07-21 RX ORDER — BUDESONIDE 0.5 MG/2ML
0.5 INHALANT ORAL
Status: DISCONTINUED | OUTPATIENT
Start: 2025-07-21 | End: 2025-07-22 | Stop reason: HOSPADM

## 2025-07-21 RX ORDER — ACETAMINOPHEN 325 MG/1
650 TABLET ORAL EVERY 4 HOURS PRN
Status: DISCONTINUED | OUTPATIENT
Start: 2025-07-21 | End: 2025-07-22 | Stop reason: HOSPADM

## 2025-07-21 RX ORDER — ONDANSETRON 2 MG/ML
4 INJECTION INTRAMUSCULAR; INTRAVENOUS EVERY 6 HOURS PRN
Status: DISCONTINUED | OUTPATIENT
Start: 2025-07-21 | End: 2025-07-22 | Stop reason: HOSPADM

## 2025-07-21 RX ORDER — NITROGLYCERIN 0.4 MG/1
0.4 TABLET SUBLINGUAL
Status: DISCONTINUED | OUTPATIENT
Start: 2025-07-21 | End: 2025-07-22 | Stop reason: HOSPADM

## 2025-07-21 RX ORDER — ONDANSETRON 2 MG/ML
4 INJECTION INTRAMUSCULAR; INTRAVENOUS ONCE
Status: COMPLETED | OUTPATIENT
Start: 2025-07-21 | End: 2025-07-21

## 2025-07-21 RX ORDER — IOPAMIDOL 755 MG/ML
INJECTION, SOLUTION INTRAVASCULAR
Status: DISCONTINUED | OUTPATIENT
Start: 2025-07-21 | End: 2025-07-21 | Stop reason: HOSPADM

## 2025-07-21 RX ORDER — SODIUM CHLORIDE 9 MG/ML
75 INJECTION, SOLUTION INTRAVENOUS CONTINUOUS
Status: DISPENSED | OUTPATIENT
Start: 2025-07-21 | End: 2025-07-21

## 2025-07-21 RX ORDER — MIDAZOLAM HYDROCHLORIDE 5 MG/5ML
INJECTION, SOLUTION INTRAMUSCULAR; INTRAVENOUS
Status: DISCONTINUED | OUTPATIENT
Start: 2025-07-21 | End: 2025-07-21 | Stop reason: HOSPADM

## 2025-07-21 RX ORDER — CILOSTAZOL 100 MG/1
100 TABLET ORAL EVERY 12 HOURS SCHEDULED
Status: DISCONTINUED | OUTPATIENT
Start: 2025-07-21 | End: 2025-07-22 | Stop reason: HOSPADM

## 2025-07-21 RX ORDER — ASPIRIN 81 MG/1
81 TABLET ORAL DAILY
Status: DISCONTINUED | OUTPATIENT
Start: 2025-07-22 | End: 2025-07-22 | Stop reason: HOSPADM

## 2025-07-21 RX ORDER — SODIUM CHLORIDE 0.9 % (FLUSH) 0.9 %
10 SYRINGE (ML) INJECTION EVERY 12 HOURS SCHEDULED
Status: DISCONTINUED | OUTPATIENT
Start: 2025-07-21 | End: 2025-07-22 | Stop reason: HOSPADM

## 2025-07-21 RX ORDER — CLOPIDOGREL BISULFATE 75 MG/1
TABLET ORAL
Status: DISCONTINUED | OUTPATIENT
Start: 2025-07-21 | End: 2025-07-21 | Stop reason: HOSPADM

## 2025-07-21 RX ORDER — HYDRALAZINE HYDROCHLORIDE 20 MG/ML
INJECTION INTRAMUSCULAR; INTRAVENOUS
Status: DISCONTINUED | OUTPATIENT
Start: 2025-07-21 | End: 2025-07-21 | Stop reason: HOSPADM

## 2025-07-21 RX ORDER — ONDANSETRON 4 MG/1
4 TABLET, ORALLY DISINTEGRATING ORAL EVERY 6 HOURS PRN
Status: DISCONTINUED | OUTPATIENT
Start: 2025-07-21 | End: 2025-07-22 | Stop reason: HOSPADM

## 2025-07-21 RX ADMIN — Medication 10 ML: at 20:45

## 2025-07-21 RX ADMIN — IPRATROPIUM BROMIDE 0.5 MG: 0.5 SOLUTION RESPIRATORY (INHALATION) at 14:52

## 2025-07-21 RX ADMIN — ACETAMINOPHEN 650 MG: 325 TABLET ORAL at 20:44

## 2025-07-21 RX ADMIN — BUDESONIDE 0.5 MG: 0.5 SUSPENSION RESPIRATORY (INHALATION) at 18:28

## 2025-07-21 RX ADMIN — SODIUM CHLORIDE 1 ML/KG/HR: 9 INJECTION, SOLUTION INTRAVENOUS at 13:03

## 2025-07-21 RX ADMIN — Medication 5 MG/HR: at 16:30

## 2025-07-21 RX ADMIN — NAPROXEN 250 MG: 250 TABLET ORAL at 17:27

## 2025-07-21 RX ADMIN — CILOSTAZOL 100 MG: 100 TABLET ORAL at 20:44

## 2025-07-21 RX ADMIN — ARFORMOTEROL TARTRATE 15 MCG: 15 SOLUTION RESPIRATORY (INHALATION) at 18:28

## 2025-07-21 RX ADMIN — TRAZODONE HYDROCHLORIDE 100 MG: 100 TABLET ORAL at 20:44

## 2025-07-21 RX ADMIN — ONDANSETRON 4 MG: 2 INJECTION INTRAMUSCULAR; INTRAVENOUS at 12:47

## 2025-07-21 RX ADMIN — IPRATROPIUM BROMIDE 0.5 MG: 0.5 SOLUTION RESPIRATORY (INHALATION) at 18:28

## 2025-07-21 NOTE — Clinical Note
Allergies reviewed.  Procedural consent has been signed.  Staff has reviewed the patient's labs.  Labs have been reviewed and show abnormalities. Physician is aware of labs.

## 2025-07-21 NOTE — H&P
Patient seen and examined at bedside.  The history and physical not changed as below.    We will be performing a diagnostic left heart catheterization with possible invention based on fines.    Risk, benefits and alternatives were explained the patient wished to proceed.    Expand All Collapse All[]Expand All by Default                     Subjective:      Encounter Date:05/22/2025     Subjective  Patient ID: Nathaly Bustillos is a 70 y.o. female.     Chief Complaint:  History of Present Illness  History of Present Illness  The patient presents for evaluation of atrial fibrillation.     She began experiencing difficulty in ambulation post-COVID-19, attributing it to inadequate oxygen supply to her lower extremities. Despite consultations with pulmonary and orthopedic specialists, no definitive diagnosis was made. After 3 years, her primary care physician recommended a vascular assessment, which revealed the need for 11 stents in her aorta and bilateral legs. These were successfully placed by Dr. Jignesh Mobley at [facility]. During this period, she experienced an episode of atrial fibrillation, prompting a referral to Dr. Donnie Jameson. However, due to her 's concurrent health issues, she was unable to undergo further testing. A heart monitor was placed, but she has not yet reviewed the results. She reports no cardiac stents. She is currently on Pletal, Plavix 75 mg, and Zetia, but not on aspirin or statins due to a family history of leg aches and muscle cramps associated with these medications. Her primary care physician, Dr. Ronda Umana, recently conducted a blood workup.     She also reports dyspnea, which she believes is related to her early-stage COPD. She recalls a previous stress test that induced hyperventilation and vomiting, leading to her refusal of a Lexiscan injection.     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     The following portions of the patient's history were reviewed and updated as appropriate: allergies, current medications, past family history, past medical history, past social history, past surgical history, and problem list.     Review of Systems   Constitutional: Positive for malaise/fatigue. Negative for diaphoresis and fever.   HENT:  Negative for congestion.    Eyes:  Negative for vision loss in left eye and vision loss in right eye.   Cardiovascular:  Positive for chest pain, claudication, dyspnea on exertion and palpitations. Negative for irregular heartbeat, leg swelling, orthopnea and syncope.   Respiratory:  Negative for cough, shortness of breath and wheezing.    Hematologic/Lymphatic: Negative for adenopathy.   Skin:  Negative for rash.   Musculoskeletal:  Negative for joint pain and joint swelling.   Gastrointestinal:  Negative for abdominal pain, diarrhea, nausea and vomiting.   Neurological:  Negative for excessive daytime sleepiness, dizziness, focal weakness, light-headedness, numbness and weakness.   Psychiatric/Behavioral:  Negative for depression. The patient does not have insomnia.               Current Medications      Current Outpatient Medications:     butalbital-acetaminophen-caffeine (FIORICET, ESGIC) -40 MG per tablet, Take 1 tablet by mouth Every 4 (Four) Hours As Needed for Headache., Disp: , Rfl:     cilostazol (PLETAL) 100 MG tablet, Every 12 (Twelve) Hours., Disp: , Rfl:     diclofenac (VOLTAREN) 75 MG EC tablet, Every 12 (Twelve) Hours., Disp: , Rfl:     ezetimibe (ZETIA) 10 MG tablet, Take 1 tablet by mouth Daily., Disp: , Rfl:     gabapentin (NEURONTIN) 100 MG capsule, 1 capsule Daily., Disp: , Rfl:     Plavix 75 MG tablet, Take 1 tablet by mouth Daily., Disp: , Rfl:     traZODone (DESYREL) 100 MG tablet, Daily., Disp: , Rfl:     Trelegy Ellipta 100-62.5-25  MCG/ACT inhaler, , Disp: , Rfl:     Alirocumab (Praluent) 75 MG/ML solution auto-injector, Inject 1 mL under the skin into the appropriate area as directed Every 14 (Fourteen) Days., Disp: 2.24 mL, Rfl: 10    aspirin 81 MG EC tablet, Take 1 tablet by mouth Daily., Disp: 90 tablet, Rfl: 3    metoprolol tartrate (LOPRESSOR) 50 MG tablet, Take 50 mg at Bedtime the Night Before Coronary CTA Appointment and In the Morning 1 Hour Prior to Coronary CTA Appointment. Do not take if heart rate less than 60., Disp: 2 tablet, Rfl: 0           Objective:      Objective      Vitals:     05/22/25 1402   BP: 170/84   Pulse: 84   SpO2: 98%      Vitals and nursing note reviewed.   Constitutional:       Appearance: Normal and healthy appearance. Well-developed and not in distress.   Eyes:      Extraocular Movements: Extraocular movements intact.      Pupils: Pupils are equal, round, and reactive to light.   HENT:      Head: Normocephalic and atraumatic.    Mouth/Throat:      Pharynx: Oropharynx is clear.   Neck:      Vascular: JVD normal.      Trachea: Trachea normal.   Pulmonary:      Effort: Pulmonary effort is normal.      Breath sounds: Normal breath sounds. No wheezing. No rhonchi. No rales.   Cardiovascular:      PMI at left midclavicular line. Normal rate. Regular rhythm. Normal S1. Normal S2.       Murmurs: There is a grade 2/6 systolic murmur.      No gallop.  No click. No rub.   Pulses:     Decreased pulses.      Dorsalis pedis: 2+ bilaterally.     Posterior tibial: 2+ bilaterally.  Abdominal:      General: Bowel sounds are normal.      Palpations: Abdomen is soft.      Tenderness: There is no abdominal tenderness.   Musculoskeletal: Normal range of motion.      Cervical back: Normal range of motion and neck supple. Skin:     General: Skin is warm and dry.      Capillary Refill: Capillary refill takes less than 2 seconds.   Feet:      Right foot:      Skin integrity: Skin integrity normal.      Left foot:      Skin  integrity: Skin integrity normal.   Neurological:      Mental Status: Alert and oriented to person, place and time.      Sensory: Sensation is intact.      Motor: Motor function is intact.      Coordination: Coordination is intact.   Psychiatric:         Speech: Speech normal.         Behavior: Behavior is cooperative.         Physical Exam        Lab Review:            ECG 12 Lead     Date/Time: 5/22/2025 4:01 PM  Performed by: Joe Nugent DO     Authorized by: Joe Nugent DO  Comparison: not compared with previous ECG   Rhythm: sinus rhythm  Rate: normal  QRS axis: right     Clinical impression: abnormal EKG           Results  Imaging   - Echocardiogram: 12/2024, Normal heart function and valves     Diagnostic Testing   - Heart monitor: Normal sinus rhythm as the dominant rhythm, average rate was 72, no A-fib, 3 runs of SVT with the longest lasting 28 beats, rare PVCs, sinus tach, PACs and supraventricular tach     Assessment/Plan:      Problem List Items Addressed This Visit         Stable angina pectoris - Primary     Relevant Medications     Plavix 75 MG tablet     cilostazol (PLETAL) 100 MG tablet     metoprolol tartrate (LOPRESSOR) 50 MG tablet     Other Relevant Orders     CT Angiogram Coronary     CT Angiogram Coronary-Cardiology Interpretation     No Caffeine or Nicotine 4 Hours Prior to CTA Appointment     Nothing to Eat or Drink 4 Hours Prior to CTA Appointment     Do Not Take Phosphodiasterase Inhibitors in the 72 Hours Prior to Coronary CTA     Mixed hyperlipidemia     Relevant Medications     ezetimibe (ZETIA) 10 MG tablet     Alirocumab (Praluent) 75 MG/ML solution auto-injector     PVD (peripheral vascular disease) with claudication     Paroxysmal SVT (supraventricular tachycardia)     Relevant Medications     Plavix 75 MG tablet     cilostazol (PLETAL) 100 MG tablet     metoprolol tartrate (LOPRESSOR) 50 MG tablet      Assessment & Plan  1. Supraventricular  Tachycardia:  - Coronary CT angiogram to check for any blockages.  - Start aspirin 81 mg daily to prevent stent thrombosis.  - Continue current medications: Plavix 75 mg and Pletal.  - Discussed risks, benefits, and alternatives of treatment, including the importance of dual antiplatelet therapy to keep stents open long-term and the potential side effects such as GI bleeds or nose bleeds.     2. Hyperlipidemia:  - Start Praluent 225 mg every 14 days due to intolerance to statins (myalgias).  - Discussed risks, benefits, and alternatives of treatment, emphasizing the necessity of anticholesterol medication to prevent stent occlusion.     3. Chronic Obstructive Pulmonary Disease (COPD):  - Patient experiences shortness of breath, likely related to COPD.     Follow-up:  - Follow up in 3 months. If coronary CT angiogram results are abnormal, an earlier appointment will be scheduled.

## 2025-07-21 NOTE — OP NOTE
[facility]    Date of procedure: 7/21/2025     Procedures performed:     1.  Access to the right femoral artery via modified Seldinger technique and ultrasound guidance  2.  Left heart catheterization without retrograde crossing aortic valve to the left ventricle  3.  Selective left coronary angiography  4.  Patent hemostasis achieved in the right femoral reaccess site using a 6 Mont[place]grin Angio-Seal closure device  5.  Conscious sedation with continuous hemodynamic monitoring for 20 minutes    Risk, Benefits, and Alternatives discussed with the patient and/or family.  Plan is for moderate sedation and the patient agrees to proceed with the procedure.  An immediate assessment was done prior to the administration of moderate sedation     Indication: Persistent chest pain post recent PCI, questionable dissection plane distal to recent stent placement  Premedication: Versed, fentanyl  Contrast: Isovue 30 cc  Radiation: Flouro time= 0.6 minutes. Air Kerma= 93 mGy  Catheters:   Guiding catheter: JL 3.5 guide  PCI Hardware: None required    Procedural details:  The patient was brought to the cath lab and prepped and draped in the usual fashion.  Access was obtained in the right femoral artery via modified Seldinger technique and ultrasound guidance.  A 6 Montenegrin sheath was placed into the artery and flushed.  Next a JL 3.5 guide was inserted and used to engage the left main.  Selective left coronary Angiox for in multiple views.  Reviewed imaging with Dr. Milligan who was in agreement that there is no significant dissection flap distal to the stent and this is most likely a septal  branch overlapping that segment.  Everything was then withdrawn through the sheath and the sheath was flushed.  Patent hemostasis was achieved in the right femoral artery access site using a 6 Mont[place]in Angio-Seal closure device.  Patient tolerated procedure well without any complications.  She left Cath Lab stable  condition.        I supervised the administration of conscious sedation by nursing staff throughout the case.  First dose was given at 1141 and the end of my face-to-face encounter was at 12:00, accounting for a total of 20 minutes of supervision.  During the case, continuous pulse oximetry, heart rate, blood pressure, and patient status were monitored.     Findings:    Hemodynamics:    Aorta: 155/95        Selective coronary angiography:       Left main: Large-caliber vessel that bifurcates into the LAD and left circumflex, no angiographic evidence of stenosis, KIRBY-3 flow     LAD: Large-caliber vessel with a widely patent stent present in the proximal segment, minor disease noted in the distal vessel, KIRBY-3 flow.  No significant dissection appreciated on repeat imaging compared to post PCI angiography earlier in the morning.    Diagonals: First diagonal is moderate caliber no significant disease, second diagonal is also moderate caliber with no significant disease, remaining diagonals are small caliber vessels     Left circumflex: Large-caliber vessel with minor disease noted at the ostium, there is also a 10 to 20% stenosis in the midsegment with minor calcification, KIRBY-3 flow distally     Obtuse marginals: First OM is moderate caliber no significant disease, second OM is small caliber, third OM is moderate caliber with no significant disease\      Estimated Blood Loss: 10 cc    Specimens: None    Complications: None       Impression:  1.  Coronary artery disease as described above including a widely patent stent in the proximal LAD with no significant complications (in particular, no distal edge dissection)     Plan:   1.  Admit to the floor and monitor closely overnight with plans for discharge home in the morning  2.  Continue dual antiplatelet therapy  3.  Allergic to statins, PCSK9 inhibitor continuation  4.  Referral for cardiac rehab      Joe Nugent DO  Interventional cardiology  [facility]  medical group  July 21, 2025

## 2025-07-21 NOTE — PLAN OF CARE
Goal Outcome Evaluation:              Outcome Evaluation: Patient is alert and oriented x 4. Heart cath done today, right groin access. Site is soft, no hematoma present, no oozing at this time. Complaints of left jaw and chest pain. Cardizem drip running at 5, due to increased heart rate. Bed left in lowest position. Educated patient on use of call light for safety before getting out of bed.

## 2025-07-21 NOTE — OP NOTE
"  [facility]      Date of procedure: 7/21/2025     Procedures performed:     1.  Access to the right femoral artery via modified Seldinger technique  2.  Left heart catheterization with retrograde crossing Revonto left ventricle pressure recorded  3.  LV ventriculography  4.  Selective bilateral coronary angiography  5.  Conscious sedation with continuous hemodynamic monitoring for 35 minutes  6.  Patent hemostasis achieved in the right femoral reaccess site using a 6 Kazakh Angio-Seal closure device  7.  Successful PTCA to the proximal LAD with an emerge MR 2 x 12 mm balloon  8.  Successful PCI to the proximal LAD with a Xience Skypoint 3.5 x 18 mm drug-eluting stent  9.  Successful noncompliant PTCA to the LAD with an NC trek Pavel 4 x 12 mm balloon      Risk, Benefits, and Alternatives discussed with the patient and/or family.  Plan is for moderate sedation and the patient agrees to proceed with the procedure.  An immediate assessment was done prior to the administration of moderate sedation     Indication: Abnormal cardiac CT  Premedication: Versed, fentanyl  Contrast: Isovue 149 cc  Radiation: Flouro time= 5.7 minutes. Air Kerma= 355 mGy  Catheters: 6Fr JL4, 6Fr JR4, 6Fr angled pigtail  Guiding catheter: XB 3.5  PCI Hardware: .014\" BMW wire, balloon assistance as outlined above      Procedural details:    The patient was brought to the cath lab and prepped and draped in the usual fashion.  Access was obtained in the right femoral artery via modified Seldinger technique.  A 6 Kazakh sheath was placed into the artery and flushed.  Next, a JL 4 was inserted and used to engage the left main selective left coronary Angiox for in multiple views.  We then inserted JR4 which was used to engage the right selective right coronary angiography performed multiple views.  Next, pigtail catheter was inserted and used to cross the aortic valve to the left ventricle pressures were recorded and LV " ventriculography was performed.  This catheter was then pulled back cross aortic valve and again pressures were recorded.  Next, an XB 3.5 catheter was inserted and used to engage the left main.  A BMW wire was inserted and advanced into the distal LAD.  We then inserted an emerge MR 2 x 12 mm balloon to the proximal segment across the lesion where it was inflated.  We then inserted a Xience Skypoint 3.5 x 18 mm drug-eluting stent to the proximal LAD where deployed at 14 milagro for 45 seconds.  This was followed by a NC trek Pavel 4 x 12 mm balloon which was inserted into the recently deployed stent where it was inflated x 2.  Postintervention angiography 4 in multiple views.  Everything was then withdrawn to the sheath and the sheath was flushed.  Patient tolerated the procedure well without any complications.  She left Cath Lab stable condition.        I supervised the administration of conscious sedation by nursing staff throughout the case.  First dose was given at 1017 and the end of my face-to-face encounter was at 1053, accounting for a total of 35 minutes of supervision.  During the case, continuous pulse oximetry, heart rate, blood pressure, and patient status were monitored.     Findings:    Hemodynamics:    Aortic: 127/58 mmHg  LV: 130/1 mmHg  LVEDP: 17 mmHg    Left ventriculography:  1. The contractility of the left ventricle is normal.  Estimated ejection fraction 70%.  2.  No significant gradient crossroad valve on pullback    Selective coronary angiography:     Left main: Large-caliber vessel that bifurcates into the LAD and left circumflex, no angiographic evidence of stenosis, KIRBY-3 flow    LAD:Moderate calcification noted in the proximal segment, there is 80 to 90% stenosis also in the proximal segment, remainder of the vessel has minor luminal regularities only, KIRBY-3 flow.  Status post successful PTCA and stent placement we had continuation of KIRBY-3 flow and 0% residual stenosis  remaining    Diagonals: First diagonal is moderate caliber no significant disease, second diagonal is also moderate caliber with no significant disease, remaining diagonals are small caliber vessels    Left circumflex: Large-caliber vessel with minor disease noted at the ostium, there is also a 10 to 20% stenosis in the midsegment with minor calcification, KIRBY-3 flow distally    Obtuse marginals: First OM is moderate caliber no significant disease, second OM is small caliber, third OM is moderate caliber with no significant disease    RCA: Large-caliber vessel with minor calcification noted in the mid and proximal segments, approximately 10 to 20% stenosis in the midsegment, KIRBY-3 flow distally.    PDA/BATSHEVA: The BATSHEVA is moderate caliber with multiple branches with no significant disease, PDA is small caliber      Estimated Blood Loss: 20 cc    Specimens: None    Complications: None       Impression:  1.  Coronary artery disease as described above including an 80 to 90% stenosis in the proximal LAD that underwent successful PTCA and stent placement with continuation of KIRBY-3 flow and 0% residual stenosis remaining     Plan:   1.  Admit to the hospital monitor closely overnight with plans for discharge home in the morning  2.  Dual antiplatelet therapy with aspirin and Plavix without interruption for at least 6 months  3.  Patient is allergic to statins, continue on PCSK9 inhibitor on discharge  4.  Referral for cardiac rehab  5.  Close follow-up with [facility] in outpatient continue to optimize her cardiovascular regimen      Joe Nugent, DO  Interventional cardiology  [facility]  July 21, 2025

## 2025-07-21 NOTE — Clinical Note
Second inflation of balloon - Max pressure = 16 milagro. 2nd Inflation of balloon - Duration = 30 seconds.

## 2025-07-21 NOTE — Clinical Note
The left DP pulse is +2. The right DP pulse is +1. The left PT pulse is +2. The right PT pulse is +1.

## 2025-07-22 ENCOUNTER — HOSPITAL ENCOUNTER (OUTPATIENT)
Dept: CARDIOLOGY | Facility: HOSPITAL | Age: 70
Setting detail: OBSERVATION
Discharge: HOME | End: 2025-07-22
Payer: MEDICARE

## 2025-07-22 VITALS
HEART RATE: 86 BPM | DIASTOLIC BLOOD PRESSURE: 57 MMHG | RESPIRATION RATE: 18 BRPM | HEIGHT: 62 IN | OXYGEN SATURATION: 92 % | SYSTOLIC BLOOD PRESSURE: 146 MMHG | WEIGHT: 109 LBS | TEMPERATURE: 98.7 F | BODY MASS INDEX: 20.06 KG/M2

## 2025-07-22 DIAGNOSIS — I47.10 PAROXYSMAL SVT (SUPRAVENTRICULAR TACHYCARDIA): ICD-10-CM

## 2025-07-22 DIAGNOSIS — Z95.5 S/P DRUG ELUTING CORONARY STENT PLACEMENT: Primary | ICD-10-CM

## 2025-07-22 LAB
ANION GAP SERPL CALCULATED.3IONS-SCNC: 12 MMOL/L (ref 5–15)
BUN SERPL-MCNC: 10.5 MG/DL (ref 8–23)
BUN/CREAT SERPL: 13.3 (ref 7–25)
CALCIUM SPEC-SCNC: 8.8 MG/DL (ref 8.6–10.5)
CHLORIDE SERPL-SCNC: 100 MMOL/L (ref 98–107)
CO2 SERPL-SCNC: 21 MMOL/L (ref 22–29)
CREAT SERPL-MCNC: 0.79 MG/DL (ref 0.57–1)
DEPRECATED RDW RBC AUTO: 49.1 FL (ref 37–54)
EGFRCR SERPLBLD CKD-EPI 2021: 80.1 ML/MIN/1.73
ERYTHROCYTE [DISTWIDTH] IN BLOOD BY AUTOMATED COUNT: 13.7 % (ref 12.3–15.4)
GLUCOSE SERPL-MCNC: 112 MG/DL (ref 65–99)
HCT VFR BLD AUTO: 40.1 % (ref 34–46.6)
HGB BLD-MCNC: 12.8 G/DL (ref 12–15.9)
MCH RBC QN AUTO: 31.1 PG (ref 26.6–33)
MCHC RBC AUTO-ENTMCNC: 31.9 G/DL (ref 31.5–35.7)
MCV RBC AUTO: 97.3 FL (ref 79–97)
PLATELET # BLD AUTO: 281 10*3/MM3 (ref 140–450)
PMV BLD AUTO: 9.8 FL (ref 6–12)
POTASSIUM SERPL-SCNC: 3.9 MMOL/L (ref 3.5–5.2)
QT INTERVAL: 340 MS
QT INTERVAL: 364 MS
QTC INTERVAL: 467 MS
QTC INTERVAL: 492 MS
RBC # BLD AUTO: 4.12 10*6/MM3 (ref 3.77–5.28)
SODIUM SERPL-SCNC: 133 MMOL/L (ref 136–145)
WBC NRBC COR # BLD AUTO: 10.18 10*3/MM3 (ref 3.4–10.8)

## 2025-07-22 PROCEDURE — 94799 UNLISTED PULMONARY SVC/PX: CPT

## 2025-07-22 PROCEDURE — 93010 ELECTROCARDIOGRAM REPORT: CPT | Performed by: INTERNAL MEDICINE

## 2025-07-22 PROCEDURE — A9270 NON-COVERED ITEM OR SERVICE: HCPCS | Performed by: EMERGENCY MEDICINE

## 2025-07-22 PROCEDURE — 63710000001 CILOSTAZOL 100 MG TABLET: Performed by: EMERGENCY MEDICINE

## 2025-07-22 PROCEDURE — 85027 COMPLETE CBC AUTOMATED: CPT | Performed by: EMERGENCY MEDICINE

## 2025-07-22 PROCEDURE — 63710000001 NAPROXEN 250 MG TABLET: Performed by: EMERGENCY MEDICINE

## 2025-07-22 PROCEDURE — 93246 EXT ECG>7D<15D RECORDING: CPT

## 2025-07-22 PROCEDURE — 94761 N-INVAS EAR/PLS OXIMETRY MLT: CPT

## 2025-07-22 PROCEDURE — 93005 ELECTROCARDIOGRAM TRACING: CPT | Performed by: EMERGENCY MEDICINE

## 2025-07-22 PROCEDURE — 63710000001 ASPIRIN 81 MG TABLET DELAYED-RELEASE: Performed by: EMERGENCY MEDICINE

## 2025-07-22 PROCEDURE — 63710000001 ACETAMINOPHEN 325 MG TABLET: Performed by: EMERGENCY MEDICINE

## 2025-07-22 PROCEDURE — G0378 HOSPITAL OBSERVATION PER HR: HCPCS

## 2025-07-22 PROCEDURE — 99238 HOSP IP/OBS DSCHRG MGMT 30/<: CPT | Performed by: EMERGENCY MEDICINE

## 2025-07-22 PROCEDURE — 94664 DEMO&/EVAL PT USE INHALER: CPT

## 2025-07-22 PROCEDURE — 80048 BASIC METABOLIC PNL TOTAL CA: CPT | Performed by: EMERGENCY MEDICINE

## 2025-07-22 PROCEDURE — 63710000001 CLOPIDOGREL 75 MG TABLET: Performed by: EMERGENCY MEDICINE

## 2025-07-22 RX ORDER — DILTIAZEM HCL 60 MG
90 TABLET ORAL 4 TIMES DAILY
Qty: 180 TABLET | Refills: 6 | Status: SHIPPED | OUTPATIENT
Start: 2025-07-22

## 2025-07-22 RX ADMIN — IPRATROPIUM BROMIDE 0.5 MG: 0.5 SOLUTION RESPIRATORY (INHALATION) at 06:32

## 2025-07-22 RX ADMIN — Medication 10 MG/HR: at 03:13

## 2025-07-22 RX ADMIN — BUDESONIDE 0.5 MG: 0.5 SUSPENSION RESPIRATORY (INHALATION) at 06:37

## 2025-07-22 RX ADMIN — CLOPIDOGREL BISULFATE 75 MG: 75 TABLET, FILM COATED ORAL at 09:13

## 2025-07-22 RX ADMIN — ASPIRIN 81 MG: 81 TABLET, COATED ORAL at 09:13

## 2025-07-22 RX ADMIN — ACETAMINOPHEN 650 MG: 325 TABLET ORAL at 09:13

## 2025-07-22 RX ADMIN — NAPROXEN 250 MG: 250 TABLET ORAL at 09:13

## 2025-07-22 RX ADMIN — CILOSTAZOL 100 MG: 100 TABLET ORAL at 09:13

## 2025-07-22 NOTE — DISCHARGE SUMMARY
[facility] DISCHARGE    Date of Discharge:  7/22/2025    Discharge Diagnosis: Coronary artery disease status post PCI    Presenting Problem/History of Present Illness  Abnormal findings on diagnostic imaging of heart and coronary circulation [R93.1]  Stable angina pectoris [I20.89]  CAD S/P percutaneous coronary angioplasty [I25.10, Z98.61]        Hospital Course  Patient is a 70 y.o. female presented as an outpatient to undergo diagnostic angiography.  Please see dedicated procedure note for details.  She underwent PTCA and stent placement to her LAD.  She was monitored overnight without any complications.  She is being discharged home in stable condition on aspirin and Plavix as well as PCSK9 inhibitor as she is intolerant to statins.  She had some tachycardia last evening for which a Cardizem drip was started.  She required Cardizem at a rate of 10.  Therefore, we will discharge her home on Cardizem 90 4 times daily.  She will also be wearing a monitor for 14 days and will follow-up me in the office afterwards.    Procedures Performed  Procedure(s):  Left Heart Cath         Condition on Discharge: Stable    Physical Exam at Discharge    Vital Signs  Temp:  [97.4 °F (36.3 °C)-98.7 °F (37.1 °C)] 98.7 °F (37.1 °C)  Heart Rate:  [] 86  Resp:  [13-18] 18  BP: (113-167)/(51-85) 146/57    Physical Exam:  Vitals and nursing note reviewed.   Constitutional:       Appearance: Normal and healthy appearance. Well-developed and not in distress.   Eyes:      Extraocular Movements: Extraocular movements intact.      Pupils: Pupils are equal, round, and reactive to light.   HENT:      Head: Normocephalic and atraumatic.    Mouth/Throat:      Pharynx: Oropharynx is clear.   Neck:      Vascular: JVD normal.      Trachea: Trachea normal.   Pulmonary:      Effort: Pulmonary effort is normal.      Breath sounds: Normal breath sounds. No wheezing. No rhonchi. No rales.   Cardiovascular:      PMI at left  midclavicular line. Normal rate. Regular rhythm. Normal S1. Normal S2.       Murmurs: There is a grade 2/6 systolic murmur.      No gallop.  No click. No rub.   Pulses:     Dorsalis pedis: 2+ bilaterally.     Posterior tibial: 2+ bilaterally.  Abdominal:      General: Bowel sounds are normal.      Palpations: Abdomen is soft.      Tenderness: There is no abdominal tenderness.   Musculoskeletal: Normal range of motion.      Cervical back: Normal range of motion and neck supple. Skin:     General: Skin is warm and dry.      Capillary Refill: Capillary refill takes less than 2 seconds.   Feet:      Right foot:      Skin integrity: Skin integrity normal.      Left foot:      Skin integrity: Skin integrity normal.   Neurological:      Mental Status: Alert and oriented to person, place and time.      Sensory: Sensation is intact.      Motor: Motor function is intact.      Coordination: Coordination is intact.   Psychiatric:         Speech: Speech normal.         Behavior: Behavior is cooperative.         Discharge Disposition  Home or Self Care    Discharge Medications     Discharge Medications        New Medications        Instructions Start Date   dilTIAZem 90 MG tablet  Commonly known as: CARDIZEM   90 mg, Oral, 4 Times Daily             Continue These Medications        Instructions Start Date   aspirin 81 MG EC tablet   81 mg, Oral, Daily      butalbital-acetaminophen-caffeine -40 MG per tablet  Commonly known as: FIORICET, ESGIC   1 tablet, Every 4 Hours PRN      cilostazol 100 MG tablet  Commonly known as: PLETAL   100 mg, Oral, 2 Times Daily      diclofenac 75 MG EC tablet  Commonly known as: VOLTAREN   75 mg, Oral, 2 Times Daily      ezetimibe 10 MG tablet  Commonly known as: ZETIA   10 mg, Daily      gabapentin 100 MG capsule  Commonly known as: NEURONTIN   100 mg, Oral, 4 Times Daily PRN      Plavix 75 MG tablet  Generic drug: clopidogrel   75 mg, Daily      Praluent 75 MG/ML solution  auto-injector  Generic drug: Alirocumab   75 mg, Subcutaneous, Every 14 Days      traZODone 100 MG tablet  Commonly known as: DESYREL   100 mg, Oral, Daily      Trelegy Ellipta 100-62.5-25 MCG/ACT inhaler  Generic drug: Fluticasone-Umeclidin-Vilant   Inhale 2 puffs Daily.               Discharge Diet: Heart healthy    Activity at Discharge: As tolerated    Follow-up Appointments  Future Appointments   Date Time Provider Department Center   9/17/2025 11:30 AM William Winn APRN [facility] CD P[facility] PAD         Test Results Pending at Discharge       Joe Nugent DO  Interventional cardiology  [facility]  07/22/25  09:07 CDT

## 2025-07-22 NOTE — PLAN OF CARE
Goal Outcome Evaluation:                    Patient A&Ox4. Very pleasant. No acute events overnight. Denies any pain at this time. VSS. Room Air. Resting peacefully in bed. Continues Cardizem gtt @ 10/hr. Will continue to monitor and update as needed with any changes.

## 2025-07-23 LAB
QT INTERVAL: 366 MS
QTC INTERVAL: 437 MS

## 2025-07-25 ENCOUNTER — NURSE TRIAGE (OUTPATIENT)
Dept: CALL CENTER | Facility: HOSPITAL | Age: 70
End: 2025-07-25
Payer: MEDICARE

## 2025-07-25 NOTE — TELEPHONE ENCOUNTER
Having indigestion, usually in the mornings after taking medication. Symptom started in the hospital on Tuesday  Drinks a lot of coffee, advised to avoid caffeine, discussed foods to avoid. Advised antacid   If indigestion persists after guideline recommendations advised to seek medical care.   Care advice per guideline.  No indigestion symptoms now, usually just in the mornings.  Asking if she can take a stool softner says she is a little constipated. Advised yes, stool softner will be fine.                Reason for Disposition   [1] Abdominal pain is intermittent AND [2] shoots into chest, with sour taste in mouth    Additional Information   Negative: SEVERE difficulty breathing (e.g., struggling for each breath, speaks in single words)   Negative: Shock suspected (e.g., cold/pale/clammy skin, too weak to stand, low BP, rapid pulse)   Negative: Difficult to awaken or acting confused (e.g., disoriented, slurred speech)   Negative: Passed out (i.e., lost consciousness, collapsed and was not responding)   Negative: Visible sweat on face or sweat dripping down face   Negative: Sounds like a life-threatening emergency to the triager   Negative: Followed an abdomen (stomach) injury   Negative: Chest pain   Negative: [1] Abdominal pain AND [2] pregnant < 20 weeks   Negative: [1] Abdominal pain AND [2] pregnant 20 or more weeks   Negative: [1] Abdominal pain AND [2] postpartum (from 0 to 6 weeks after delivery)   Negative: Abdomen bloating or swelling are main symptoms   Negative: [1] SEVERE pain (e.g., excruciating) AND [2] present > 1 hour   Negative: [1] Pain lasts > 10 minutes AND [2] age > 50   Negative: [1] Pain lasts > 10 minutes AND [2] age > 40 AND [3] associated chest, arm, neck, upper back or jaw pain   Negative: [1] Pain lasts > 10 minutes AND [2] age > 35 AND [3] at least one cardiac risk factor (e.g., diabetes, high cholesterol, hypertension, obesity, smoker or strong family history of heart disease)    "Negative: [1] Pain lasts > 10 minutes AND [2] history of heart disease (i.e., heart attack, bypass surgery, angina, angioplasty, CHF; not just a heart murmur)   Negative: [1] Pain lasts > 10 minutes AND [2] difficulty breathing   Negative: [1] Vomiting AND [2] contains red blood  (Exception: Few streaks and only occurred once.)   Negative: [1] Vomiting AND [2] contains black (\"coffee ground\") material   Negative: Blood in bowel movements  (Exception: Blood on surface of BM with constipation.)   Negative: Black or tarry bowel movements  (Exception: Chronic-unchanged black-grey BMs AND is taking iron pills or Pepto-Bismol.)   Negative: [1] Pregnant 20 or more weeks AND [2] new hand or face swelling   Negative: [1] Vomiting AND [2] contains bile (green color)   Negative: Patient sounds very sick or weak to the triager   Negative: [1] MILD-MODERATE pain AND [2] constant AND [3] present > 2 hours   Negative: [1] MILD-MODERATE pain AND [2] not relieved by antacid medicine   Negative: [1] Vomiting AND [2] abdomen looks much more swollen than usual   Negative: White of the eyes have turned yellow (i.e., jaundice)   Negative: Fever > 103 F (39.4 C)   Negative: [1] Fever > 101 F (38.3 C) AND [2] age > 60 years   Negative: [1] Fever > 100.0 F (37.8 C) AND [2] bedridden (e.g., CVA, chronic illness, recovering from surgery)   Negative: [1] Fever > 100.0 F (37.8 C) AND [2] diabetes mellitus or weak immune system (e.g., HIV positive, cancer chemo, splenectomy, organ transplant, chronic steroids)   Negative: [1] MODERATE pain (e.g., interferes with normal activities) AND [2] comes and goes (cramps) AND [3] present > 24 hours  (Exception: Pain with Vomiting or Diarrhea - see that Guideline.)   Negative: [1] MILD pain (e.g., does not interfere with normal activities) AND [2] comes and goes (cramps) AND [3] present > 72 hours  (Exception: This same abdominal pain is a chronic symptom recurrent or ongoing AND present > 4 weeks.)   " "Negative: Unhealthy alcohol use, known or suspected    Answer Assessment - Initial Assessment Questions  1. LOCATION: \"Where does it hurt?\"        Feels like indigestion  2. RADIATION: \"Does the pain shoot anywhere else?\" (e.g., chest, back)       Denies   3. ONSET: \"When did the pain begin?\" (e.g., minutes, hours or days ago)         Started Tuesday  4. SUDDEN: \"Gradual or sudden onset?\"      *No Answer*  5. PATTERN \"Does the pain come and go, or is it constant?\"     - If it comes and goes: \"How long does it last?\" \"Do you have pain now?\"      (Note: Comes and goes means the pain is intermittent. It goes away completely between bouts.)     - If constant: \"Is it getting better, staying the same, or getting worse?\"       (Note: Constant means the pain never goes away completely; most serious pain is constant and gets worse.)   intermittent  6. SEVERITY: \"How bad is the pain?\"  (e.g., Scale 1-10; mild, moderate, or severe)     - MILD (1-3): Doesn't interfere with normal activities, abdomen soft and not tender to touch..      - MODERATE (4-7): Interferes with normal activities or awakens from sleep, abdomen tender to touch.      - SEVERE (8-10): Excruciating pain, doubled over, unable to do any normal activities.         Discomfort 2/10  7. RECURRENT SYMPTOM: \"Have you ever had this type of stomach pain before?\" If Yes, ask: \"When was the last time?\" and \"What happened that time?\"       *No Answer*  8. AGGRAVATING FACTORS: \"Does anything seem to cause this pain?\" (e.g., foods, stress, alcohol)      Drinks lot of coffee  9. CARDIAC SYMPTOMS: \"Do you have any of the following symptoms: chest pain, difficulty breathing, sweating, nausea?\"      Denies these symptoms  10. OTHER SYMPTOMS: \"Do you have any other symptoms?\" (e.g., back pain, diarrhea, fever, urination pain, vomiting)    denies  11. PREGNANCY: \"Is there any chance you are pregnant?\" \"When was your last menstrual period?\"        *No Answer*    Protocols used: " Abdominal Pain - Upper-ADULT-AH

## 2025-07-26 ENCOUNTER — NURSE TRIAGE (OUTPATIENT)
Dept: CALL CENTER | Facility: HOSPITAL | Age: 70
End: 2025-07-26
Payer: MEDICARE

## 2025-07-27 NOTE — TELEPHONE ENCOUNTER
Reason for Disposition   Nausea lasts > 1 week   Last bowel movement (BM) > 4 days ago    Additional Information   Negative: Shock suspected (e.g., cold/pale/clammy skin, too weak to stand, low BP, rapid pulse)   Negative: Sounds like a life-threatening emergency to the triager   Negative: [1] Nausea or vomiting AND [2] pregnancy < 20 weeks   Negative: Menstrual Period - Missed or Late (i.e., pregnancy suspected)   Negative: Heat exhaustion suspected (i.e., dehydration from heat exposure)   Negative: Motion sickness suspected (i.e., nausea with car, plane, boat, or train travel)   Negative: Anxiety or stress suspected (i.e., nausea with anxiety attacks or stressful situations)   Negative: Traumatic Brain Injury (TBI) suspected   Negative: Nausea (or Vomiting) in a cancer patient who is currently (or recently) receiving chemotherapy or radiation therapy, or cancer patient who has metastatic or end-stage cancer and is receiving palliative care   Negative: Vomiting occurs   Negative: Other symptom is present, see that guideline (e.g., chest pain, headache, dizziness, abdominal pain, colds, sore throat, etc.).   Negative: Unable to walk, or can only walk with assistance (e.g., requires support)   Negative: Difficulty breathing   Negative: [1] Insulin-dependent diabetes (Type I) AND [2] glucose > 400 mg/dl (22 mmol/l)   Negative: [1] Drinking very little AND [2] dehydration suspected (e.g., no urine > 12 hours, very dry mouth, very lightheaded)   Negative: Patient sounds very sick or weak to the triager   Negative: Fever > 104 F (40 C)   Negative: [1] Fever > 101 F (38.3 C) AND [2] age > 60 years   Negative: [1] Fever > 100.0 F (37.8 C) AND [2] bedridden (e.g., CVA, chronic illness, recovering from surgery)   Negative: [1] Fever > 100.0 F (37.8 C) AND [2] diabetes mellitus or weak immune system (e.g., HIV positive, cancer chemo, splenectomy, organ transplant, chronic steroids)   Negative: Taking any of the following  "medications: digoxin (Lanoxin), lithium, theophylline, phenytoin (Dilantin)   Negative: Yellowish color of the skin or white of the eye (i.e., jaundice)   Negative: Fever present > 3 days (72 hours)   Negative: Receiving cancer chemotherapy medication   Negative: Taking prescription medication that could cause nausea (e.g., narcotics/opiates, antibiotics, OCPs, many others)   Negative: [1] Abdomen pain is main symptom AND [2] male   Negative: [1] Abdomen pain is main symptom AND [2] adult female   Negative: Rectal bleeding or blood in stool is main symptom   Negative: Rectal pain or itching is main symptom   Negative: Constipation in a cancer patient who is currently (or recently) receiving chemotherapy or radiation therapy, or cancer patient who has metastatic or end-stage cancer and is receiving palliative care   Negative: [1] Vomiting AND [2] contains bile (green color)   Negative: Patient sounds very sick or weak to the triager   Negative: [1] Vomiting AND [2] abdomen looks much more swollen than usual   Negative: [1] Constant abdominal pain AND [2] present > 2 hours   Negative: [1] Rectal pain or fullness from fecal impaction (rectum full of stool) AND [2] NOT better after SITZ bath, suppository or enema   Negative: [1] Intermittent mild abdominal pain AND [2] fever   Negative: Abdomen is more swollen than usual    Answer Assessment - Initial Assessment Questions  1. NAUSEA SEVERITY: \"How bad is the nausea?\" (e.g., mild, moderate, severe; dehydration, weight loss)    - MILD: loss of appetite without change in eating habits    - MODERATE: decreased oral intake without significant weight loss, dehydration, or malnutrition    - SEVERE: inadequate caloric or fluid intake, significant weight loss, symptoms of dehydration      Moderate; uncomfortable to swallow liquids  2. ONSET: \"When did the nausea begin?\"      Heartburn started Tuesday  3. VOMITING: \"Any vomiting?\" If Yes, ask: \"How many times today?\"      " "denies  4. RECURRENT SYMPTOM: \"Have you had nausea before?\" If Yes, ask: \"When was the last time?\" \"What happened that time?\"      denies  5. CAUSE: \"What do you think is causing the nausea?\"      Heart cath with stent placed on Monday; she has used antiacids  6. PREGNANCY: \"Is there any chance you are pregnant?\" (e.g., unprotected intercourse, missed birth control pill, broken condom)      na    Answer Assessment - Initial Assessment Questions  1. STOOL PATTERN OR FREQUENCY: \"How often do you have a bowel movement (BM)?\"  (Normal range: 3 times a day to every 3 days)  \"When was your last BM?\"        Usually every other day; last BM was over 7 days ago  2. STRAINING: \"Do you have to strain to have a BM?\"       sometimes  3. RECTAL PAIN: \"Does your rectum hurt when the stool comes out?\" If Yes, ask: \"Do you have hemorrhoids? How bad is the pain?\"  (Scale 1-10; or mild, moderate, severe)      denies  4. STOOL COMPOSITION: \"Are the stools hard?\"       yes  5. BLOOD ON STOOLS: \"Has there been any blood on the toilet tissue or on the surface of the BM?\" If Yes, ask: \"When was the last time?\"      denies  6. CHRONIC CONSTIPATION: \"Is this a new problem for you?\"  If No, ask: \"How long have you had this problem?\" (days, weeks, months)       More frequent in the last few months to go longer without BMs  7. CHANGES IN DIET OR HYDRATION: \"Have there been any recent changes in your diet?\" \"How much fluids are you drinking on a daily basis?\"  \"How much have you had to drink today?\"      Yes; trying to lose weight; not drinking 8 glasses of water/day  8. MEDICINES: \"Have you been taking any new medicines?\" \"Are you taking any narcotic pain medicines?\" (e.g., Dilaudid, morphine, Percocet, Vicodin)      Diltiazem; gabapentin; diclofenac   9. LAXATIVES: \"Have you been using any stool softeners, laxatives, or enemas?\"  If Yes, ask \"What, how often, and when was the last time?\"      Stool softeners started this week  10. ACTIVITY:  " "\"How much walking do you do every day?\"  \"Has your activity level decreased in the past week?\"         Decreased activity due to heat and recent heart cath  11. CAUSE: \"What do you think is causing the constipation?\"         Heart cath with stent  12. OTHER SYMPTOMS: \"Do you have any other symptoms?\" (e.g., abdomen pain, bloating, fever, vomiting)        Bloating, indigestion  13. MEDICAL HISTORY: \"Do you have a history of hemorrhoids, rectal fissures, or rectal surgery or rectal abscess?\"          denies  14. PREGNANCY: \"Is there any chance you are pregnant?\" \"When was your last menstrual period?\"        na    Protocols used: Nausea-ADULT-AH, Constipation-ADULT-AH    "

## 2025-08-15 LAB
CV ZIO BASELINE AVG BPM: 100 BPM
CV ZIO BASELINE BPM HIGH: 197 BPM
CV ZIO BASELINE BPM LOW: 75 BPM
CV ZIO DEVICE ANALYSIS TIME: NORMAL
CV ZIO ECT SVE COUNT: 4295 EPISODES
CV ZIO ECT SVE CPLT COUNT: 109 EPISODES
CV ZIO ECT SVE CPLT FREQ: NORMAL
CV ZIO ECT SVE FREQ: NORMAL
CV ZIO ECT SVE TPLT COUNT: 35 EPISODES
CV ZIO ECT SVE TPLT FREQ: NORMAL
CV ZIO ECT VE COUNT: 839 EPISODES
CV ZIO ECT VE CPLT COUNT: 27 EPISODES
CV ZIO ECT VE CPLT FREQ: NORMAL
CV ZIO ECT VE FREQ: NORMAL
CV ZIO ECT VE TPLT COUNT: 1 EPISODES
CV ZIO ECT VE TPLT FREQ: NORMAL
CV ZIO ECTOPIC SVE COUPLET RAW PERCENT: 0.01 %
CV ZIO ECTOPIC SVE ISOLATED PERCENT: 0.23 %
CV ZIO ECTOPIC SVE TRIPLET RAW PERCENT: 0.01 %
CV ZIO ECTOPIC VE COUPLET RAW PERCENT: 0 %
CV ZIO ECTOPIC VE ISOLATED PERCENT: 0.05 %
CV ZIO ECTOPIC VE TRIPLET RAW PERCENT: 0 %
CV ZIO ENROLLMENT END: NORMAL
CV ZIO ENROLLMENT START: NORMAL
CV ZIO PATIENT EVENTS DIARIES: 0
CV ZIO PATIENT EVENTS TRIGGERS: 20
CV ZIO PAUSE COUNT: 0
CV ZIO PRESCRIPTION STATUS: NORMAL
CV ZIO SVT AVG BPM: 145 BPM
CV ZIO SVT BPM HIGH: 197 BPM
CV ZIO SVT BPM LOW: 88 BPM
CV ZIO SVT COUNT: 29
CV ZIO SVT F EPI AVG BPM: 163 BPM
CV ZIO SVT F EPI BEATS: 5 BEATS
CV ZIO SVT F EPI BPM HIGH: 197 BPM
CV ZIO SVT F EPI BPM LOW: 146 BPM
CV ZIO SVT F EPI DUR: 1.9 SEC
CV ZIO SVT F EPI END: NORMAL
CV ZIO SVT F EPI START: NORMAL
CV ZIO SVT L EPI AVG BPM: 139 BPM
CV ZIO SVT L EPI BEATS: 19 BEATS
CV ZIO SVT L EPI BPM HIGH: 174 BPM
CV ZIO SVT L EPI BPM LOW: 88 BPM
CV ZIO SVT L EPI DUR: 8.5 SEC
CV ZIO SVT L EPI END: NORMAL
CV ZIO SVT L EPI START: NORMAL
CV ZIO TOTAL  ENROLLMENT PERIOD: NORMAL
CV ZIO VT AVG BPM: 127 BPM
CV ZIO VT BPM HIGH: 140 BPM
CV ZIO VT BPM LOW: 114 BPM
CV ZIO VT COUNT: 1
CV ZIO VT F EPI AVG BPM: 127
CV ZIO VT F EPI BEATS: 6 BEATS
CV ZIO VT F EPI BPM HIGH: 140
CV ZIO VT F EPI BPM LOW: 114
CV ZIO VT F EPI DUR: 2.8 SEC
CV ZIO VT F EPI END: NORMAL
CV ZIO VT F EPI START: NORMAL
CV ZIO VT L EPI AVG BPM: 127
CV ZIO VT L EPI BEATS: 6 BEATS
CV ZIO VT L EPI BPM HIGH: 140 BPM
CV ZIO VT L EPI BPM LOW: 114 BPM
CV ZIO VT L EPI DUR: 2.8
CV ZIO VT L EPI END: NORMAL
CV ZIO VT L EPI START: NORMAL

## 2025-08-20 ENCOUNTER — OFFICE VISIT (OUTPATIENT)
Dept: CARDIOLOGY | Facility: CLINIC | Age: 70
End: 2025-08-20
Payer: MEDICARE

## 2025-08-20 VITALS
HEART RATE: 80 BPM | SYSTOLIC BLOOD PRESSURE: 120 MMHG | RESPIRATION RATE: 18 BRPM | BODY MASS INDEX: 19.51 KG/M2 | DIASTOLIC BLOOD PRESSURE: 62 MMHG | OXYGEN SATURATION: 97 % | WEIGHT: 106 LBS | HEIGHT: 62 IN

## 2025-08-20 DIAGNOSIS — I73.9 PVD (PERIPHERAL VASCULAR DISEASE) WITH CLAUDICATION: ICD-10-CM

## 2025-08-20 DIAGNOSIS — I47.10 PAROXYSMAL SVT (SUPRAVENTRICULAR TACHYCARDIA): ICD-10-CM

## 2025-08-20 DIAGNOSIS — R00.0 SINUS TACHYCARDIA: ICD-10-CM

## 2025-08-20 DIAGNOSIS — I25.118 CORONARY ARTERY DISEASE OF NATIVE ARTERY OF NATIVE HEART WITH STABLE ANGINA PECTORIS: Primary | ICD-10-CM

## 2025-08-20 DIAGNOSIS — E78.2 MIXED HYPERLIPIDEMIA: ICD-10-CM

## 2025-08-20 RX ORDER — METOPROLOL SUCCINATE 25 MG/1
25 TABLET, EXTENDED RELEASE ORAL DAILY
Qty: 30 TABLET | Refills: 11 | Status: SHIPPED | OUTPATIENT
Start: 2025-08-20

## 2025-08-20 RX ORDER — DILTIAZEM HYDROCHLORIDE 360 MG/1
360 CAPSULE, EXTENDED RELEASE ORAL DAILY
Qty: 30 CAPSULE | Refills: 11 | Status: SHIPPED | OUTPATIENT
Start: 2025-08-20

## (undated) DEVICE — GOWN, ORBIS, XLARGE, STERILE: Brand: MEDLINE

## (undated) DEVICE — MERITMEDICAL 5F PIG CATHETER

## (undated) DEVICE — SUTURE ABSORBABLE MONOFILAMENT 3-0 SH 27 IN UD PDS + PDP416H

## (undated) DEVICE — MODEL BT2000 P/N 700287-012KIT CONTENTS: MANIFOLD WITH SALINE AND CONTRAST PORTS, SALINE TUBING WITH SPIKE AND HAND SYRINGE, TRANSDUCER: Brand: BT2000 AUTOMATED MANIFOLD KIT

## (undated) DEVICE — SYRINGE KIT,PACKAGED,,150FT,MK 7(ANGIO-ARTERION, 150ML SYR KIT W/QFT,MC)(60729385): Brand: MEDRAD® MARK 7 ARTERION DISPOSABLE SYRINGE 150 ML WITH QUICK FILL TUBE

## (undated) DEVICE — NG KIT, 21 GA, 10 PACK: Brand: SITE-RITE

## (undated) DEVICE — COPILOT BLEEDBACK CONTROL VALVE: Brand: COPILOT

## (undated) DEVICE — SOL IRR NACL 0.9PCT BO 1000ML

## (undated) DEVICE — SUTURE NONABSORBABLE MONOFILAMENT 6-0 BV-1 1X30 IN PROLENE 8709H

## (undated) DEVICE — INFLATION DEVICE: Brand: ENCORE™ 26

## (undated) DEVICE — LIQUIBAND RAPID ADHESIVE 36/CS 0.8ML: Brand: MEDLINE

## (undated) DEVICE — CATHETER KIT 5 FR 21 GAX7 CM MICROINTRODUCER GUIDEWIRE STIFF

## (undated) DEVICE — DEVICE CLSR 6-7FR 10ML VASC BAL CATH INTEGR SEAL LCK SYR

## (undated) DEVICE — VBT GC 6F .070 JL3.5 100CM: Brand: VISTA BRITE TIP

## (undated) DEVICE — INF TL MULIPACK FR6: Brand: INFINITI

## (undated) DEVICE — BLANKET WRM W29.9XL79.1IN UP BODY FORC AIR MISTRAL-AIR

## (undated) DEVICE — PINNACLE INTRODUCER SHEATH: Brand: PINNACLE

## (undated) DEVICE — RADIFOCUS GLIDEWIRE: Brand: GLIDEWIRE

## (undated) DEVICE — NAVICROSS SUPPORT CATHETER: Brand: NAVICROSS

## (undated) DEVICE — GLIDESHEATH BASIC HYDROPHILIC COATED INTRODUCER SHEATH: Brand: GLIDESHEATH

## (undated) DEVICE — PK CATH CARD 30 CA/4

## (undated) DEVICE — PTA BALLOON DILATATION CATHETER: Brand: CHARGER™

## (undated) DEVICE — C-ARM: Brand: UNBRANDED

## (undated) DEVICE — DEVICE TORQ DIA0.018-0.038IN GRN ERGO 1 HND FOR PTFE GWIRE

## (undated) DEVICE — PAD, DEFIB, ADULT, RADIOTRANS, PHYSIO: Brand: MEDLINE

## (undated) DEVICE — PTCA DILATATION CATHETER: Brand: EMERGE™

## (undated) DEVICE — SUTURE VICRYL + SZ 2-0 L18IN ABSRB CLR TIE POLYGLACTIN 910 VCP111G

## (undated) DEVICE — PACK,UNIVERSAL,NO GOWNS: Brand: MEDLINE

## (undated) DEVICE — MODEL AT P65, P/N 701554-001KIT CONTENTS: HAND CONTROLLER, 3-WAY HIGH-PRESSURE STOPCOCK WITH ROTATING END AND PREMIUM HIGH-PRESSURE TUBING: Brand: ANGIOTOUCH® KIT

## (undated) DEVICE — SUTURE VICRYL SZ 3-0 L18IN ABSRB UD POLYGLACTIN 910 BRAID TIE J910T

## (undated) DEVICE — GW STARTER FXD/CORE PTFE/COAT J/TP/3MM .035IN 10X150CM

## (undated) DEVICE — DRESSING TRNSPAR W5XL4.5IN FLM SHT SEMIPERMEABLE WIND

## (undated) DEVICE — Device: Brand: VISIONS PV .035 DIGITAL IVUS CATHETER

## (undated) DEVICE — DRAPE SHEET: Brand: UNBRANDED

## (undated) DEVICE — TUBING ANGIO L72IN OD0.142IN ID0.071IN 1200PSI POLYUR DST

## (undated) DEVICE — ANGIO-SEAL VIP VASCULAR CLOSURE DEVICE: Brand: ANGIO-SEAL

## (undated) DEVICE — TOOL INSRT GW MTL OR PLSTC

## (undated) DEVICE — SOLUTION IV 1000ML 0.9% SOD CHL PH 5 INJ USP VIAFLX PLAS

## (undated) DEVICE — SOLUTION IV 500ML 0.9% SOD CHL PH 5 INJ USP VIAFLX PLAS

## (undated) DEVICE — SUTURE PROLENE 7-0 BV-1 BL MONO 30L  (4P

## (undated) DEVICE — SUTURE VICRYL + SZ 4-0 L18IN ABSRB WHT TIE POLYGLACTIN 910 VCP109G

## (undated) DEVICE — SOLIDIFIER LIQ LIQUILOC/PLUS W/TREAT 2000CC

## (undated) DEVICE — SUTURE NONABSORBABLE MONOFILAMENT 7-0 BV-1 1X24 IN PROLENE 8702H

## (undated) DEVICE — CONQUEST® 40 PTA DILATATION CATHETER 12 MM X 40 MM, 75 CM CATHETER: Brand: CONQUEST® 40

## (undated) DEVICE — COVER LT HNDL PLAS RIG 2 PER PK

## (undated) DEVICE — PENCIL BTTN S S CAUT TIP W HOLSTER 25 50

## (undated) DEVICE — TUBE ET 7MM NSL ORAL BASIC CUF INTMED MURPHY EYE RADPQ MRK

## (undated) DEVICE — STRP TABL UNIV 96X6IN

## (undated) DEVICE — PROVE COVER: Brand: UNBRANDED

## (undated) DEVICE — 6F .070 XB 3.5 ECO PK: Brand: VISTA BRITE TIP

## (undated) DEVICE — HI-TORQUE BALANCE MIDDLEWEIGHT UNIVERSAL II GUIDE WIRE STRAIGHT TIP PAK  190 CM: Brand: HI-TORQUE BALANCE MIDDLEWEIGHT UNIVERSAL II

## (undated) DEVICE — TBG SMPL NASL MICROSTREAM/ADV LINE FLTR O2 A/

## (undated) DEVICE — NC TREK NEO™ CORONARY DILATATION CATHETER 4.00 MM X 12 MM / RAPID-EXCHANGE: Brand: NC TREK NEO™

## (undated) DEVICE — GLIDESHEATH SLENDER STAINLESS STEEL KIT: Brand: GLIDESHEATH SLENDER

## (undated) DEVICE — SOLUTION IV 250ML 0.9% SOD CHL PH 5 INJ USP VIAFLX PLAS

## (undated) DEVICE — GLOVE SURG SZ 7 L12IN FNGR THK79MIL GRN LTX FREE

## (undated) DEVICE — CATHETER ANGIO AD 5FR L65CM DIA0.049IN GWIRE 0.035IN SHEP

## (undated) DEVICE — HI-TORQUE COMMAND ES GUIDE WIRE .014" 300 CM: Brand: HI-TORQUE COMMAND

## (undated) DEVICE — A2000 MULTI-USE SYRINGE KIT, P/N 701277-003KIT CONTENTS: 100ML CONTRAST RESERVOIR AND TUBING WITH CONTRAST SPIKE AND CLAMP: Brand: A2000 MULTI-USE SYRINGE KIT

## (undated) DEVICE — KT NDL GUIDE STRL 18GA

## (undated) DEVICE — CURAVIEW LED LARYNGOSCOPE BLADE & HANDLE,DISPOSABLE,MAC 3: Brand: CURAPLEX

## (undated) DEVICE — TOWEL,OR,DSP,ST,BLUE,DLX,4/PK,20PK/CS: Brand: MEDLINE

## (undated) DEVICE — STPCK 3/WY HP M/RA W/OFF/HNDL 1050PSI STRL

## (undated) DEVICE — DRAPE,UTILITY,XL,4/PK,STERILE: Brand: MEDLINE

## (undated) DEVICE — SUTURE MONOCRYL + SZ 4-0 L18IN ABSRB UD L19MM PS-2 3/8 CIR MCP496G

## (undated) DEVICE — PERCLOSE PROGLIDE™ SUTURE-MEDIATED CLOSURE SYSTEM: Brand: PERCLOSE PROGLIDE™

## (undated) DEVICE — DRSNG PRESS SAFEGUARD

## (undated) DEVICE — Device

## (undated) DEVICE — R2P DESTINATION SLENDER GUIDING SHEATH: Brand: R2P DESTINATION SLENDER